# Patient Record
Sex: FEMALE | Race: WHITE | Employment: OTHER | URBAN - METROPOLITAN AREA
[De-identification: names, ages, dates, MRNs, and addresses within clinical notes are randomized per-mention and may not be internally consistent; named-entity substitution may affect disease eponyms.]

---

## 2017-01-17 ENCOUNTER — HOSPITAL ENCOUNTER (OUTPATIENT)
Dept: OTHER | Age: 72
Discharge: HOME | End: 2017-01-17
Attending: NURSE PRACTITIONER | Admitting: NURSE PRACTITIONER

## 2017-01-17 ENCOUNTER — HOSPITAL ENCOUNTER (OUTPATIENT)
Dept: OTHER | Age: 72
Discharge: HOME | End: 2017-01-17
Attending: INTERNAL MEDICINE | Admitting: INTERNAL MEDICINE

## 2017-01-17 ENCOUNTER — OFFICE VISIT (OUTPATIENT)
Dept: CARDIOLOGY CLINIC | Age: 72
End: 2017-01-17

## 2017-01-17 VITALS
HEART RATE: 64 BPM | WEIGHT: 154.5 LBS | HEIGHT: 61 IN | SYSTOLIC BLOOD PRESSURE: 128 MMHG | DIASTOLIC BLOOD PRESSURE: 88 MMHG | BODY MASS INDEX: 29.17 KG/M2

## 2017-01-17 DIAGNOSIS — E78.49 OTHER HYPERLIPIDEMIA: ICD-10-CM

## 2017-01-17 DIAGNOSIS — E61.1 IRON DEFICIENCY: ICD-10-CM

## 2017-01-17 DIAGNOSIS — N17.9 ACUTE RENAL FAILURE, UNSPECIFIED ACUTE RENAL FAILURE TYPE (HCC): ICD-10-CM

## 2017-01-17 DIAGNOSIS — D47.2 MONOCLONAL PARAPROTEINEMIA: ICD-10-CM

## 2017-01-17 DIAGNOSIS — D47.2 MGUS (MONOCLONAL GAMMOPATHY OF UNKNOWN SIGNIFICANCE): Chronic | ICD-10-CM

## 2017-01-17 DIAGNOSIS — E78.2 MIXED HYPERLIPIDEMIA: ICD-10-CM

## 2017-01-17 DIAGNOSIS — I10 ESSENTIAL HYPERTENSION, BENIGN: Primary | ICD-10-CM

## 2017-01-17 LAB
ALT SERPL-CCNC: 20 U/L (ref 10–40)
ANION GAP SERPL CALCULATED.3IONS-SCNC: 14 MMOL/L (ref 3–16)
AST SERPL-CCNC: 23 U/L (ref 15–37)
BUN BLDV-MCNC: 17 MG/DL (ref 7–20)
CALCIUM SERPL-MCNC: 9.1 MG/DL (ref 8.3–10.6)
CHLORIDE BLD-SCNC: 104 MMOL/L (ref 99–110)
CHOLESTEROL, TOTAL: 164 MG/DL (ref 0–199)
CO2: 23 MMOL/L (ref 21–32)
CREAT SERPL-MCNC: 0.9 MG/DL (ref 0.6–1.2)
GFR AFRICAN AMERICAN: >60
GFR NON-AFRICAN AMERICAN: >60
GLUCOSE BLD-MCNC: 98 MG/DL (ref 70–99)
HDLC SERPL-MCNC: 59 MG/DL (ref 40–60)
LDL CHOLESTEROL CALCULATED: 79 MG/DL
POTASSIUM SERPL-SCNC: 4.7 MMOL/L (ref 3.5–5.1)
SODIUM BLD-SCNC: 141 MMOL/L (ref 136–145)
TRIGL SERPL-MCNC: 130 MG/DL (ref 0–150)
VLDLC SERPL CALC-MCNC: 26 MG/DL

## 2017-01-17 PROCEDURE — 99214 OFFICE O/P EST MOD 30 MIN: CPT | Performed by: NURSE PRACTITIONER

## 2017-01-17 RX ORDER — ATORVASTATIN CALCIUM 10 MG/1
10 TABLET, FILM COATED ORAL DAILY
Qty: 90 TABLET | Refills: 5 | Status: SHIPPED | OUTPATIENT
Start: 2017-01-17 | End: 2017-05-04 | Stop reason: SDUPTHER

## 2017-01-19 ENCOUNTER — HOSPITAL ENCOUNTER (OUTPATIENT)
Dept: MAMMOGRAPHY | Age: 72
Discharge: HOME | End: 2017-01-19
Attending: INTERNAL MEDICINE | Admitting: INTERNAL MEDICINE

## 2017-01-19 DIAGNOSIS — Z12.31 VISIT FOR SCREENING MAMMOGRAM: ICD-10-CM

## 2017-05-04 RX ORDER — ATORVASTATIN CALCIUM 10 MG/1
10 TABLET, FILM COATED ORAL DAILY
Qty: 90 TABLET | Refills: 2 | Status: SHIPPED | OUTPATIENT
Start: 2017-05-04 | End: 2018-02-07 | Stop reason: SDUPTHER

## 2017-06-06 PROBLEM — K21.9 GASTROESOPHAGEAL REFLUX DISEASE WITHOUT ESOPHAGITIS: Status: ACTIVE | Noted: 2017-06-06

## 2017-06-06 PROBLEM — E03.9 HYPOTHYROID: Status: ACTIVE | Noted: 2017-06-06

## 2017-06-06 PROBLEM — N39.0 UTI (URINARY TRACT INFECTION): Status: ACTIVE | Noted: 2017-06-06

## 2017-08-04 ENCOUNTER — OFFICE VISIT (OUTPATIENT)
Dept: CARDIOLOGY CLINIC | Age: 72
End: 2017-08-04

## 2017-08-04 VITALS
DIASTOLIC BLOOD PRESSURE: 80 MMHG | BODY MASS INDEX: 28.89 KG/M2 | SYSTOLIC BLOOD PRESSURE: 150 MMHG | WEIGHT: 153 LBS | HEIGHT: 61 IN

## 2017-08-04 DIAGNOSIS — E78.2 MIXED HYPERLIPIDEMIA: ICD-10-CM

## 2017-08-04 DIAGNOSIS — I10 ESSENTIAL HYPERTENSION, BENIGN: Primary | ICD-10-CM

## 2017-08-04 PROCEDURE — 99214 OFFICE O/P EST MOD 30 MIN: CPT | Performed by: NURSE PRACTITIONER

## 2018-02-06 ENCOUNTER — OFFICE VISIT (OUTPATIENT)
Dept: CARDIOLOGY CLINIC | Age: 73
End: 2018-02-06

## 2018-02-06 VITALS
SYSTOLIC BLOOD PRESSURE: 150 MMHG | DIASTOLIC BLOOD PRESSURE: 70 MMHG | BODY MASS INDEX: 28.77 KG/M2 | WEIGHT: 152.4 LBS | HEIGHT: 61 IN

## 2018-02-06 DIAGNOSIS — I10 ESSENTIAL HYPERTENSION, BENIGN: ICD-10-CM

## 2018-02-06 DIAGNOSIS — E78.2 MIXED HYPERLIPIDEMIA: Primary | ICD-10-CM

## 2018-02-06 PROCEDURE — 99214 OFFICE O/P EST MOD 30 MIN: CPT | Performed by: NURSE PRACTITIONER

## 2018-02-06 RX ORDER — GABAPENTIN 300 MG/1
CAPSULE ORAL
Status: ON HOLD | COMMUNITY
Start: 2017-12-14 | End: 2019-07-10 | Stop reason: HOSPADM

## 2018-02-06 NOTE — COMMUNICATION BODY
[address]    Cardiac Follow Up    Cristofer Antunez is 74 y.o. female who presents today for a routine follow up. Teryl Fleischer has a history of hypertension and hyperlipidemia. In May she developed acute renal failure from medications, hyzaar, this was stopped and Evaluated by DR. Doug Duenas. She continues to FU with him. She denies SOB/ROMERO, PND, palpitations, light-headedness, or edema. 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 She is mostly bothered by \"GI issues \" in the AM after she gets up. She did go to ER in June 2017  with chest pain, had GXT which was neg. Believed pain due to GERD, she has not had any recurrence               Past Medical History:    Past Medical History:   Diagnosis Date    Celiac sprue     Hyperlipidemia     Hypertension     Osteoarthritis     Osteoporosis     Vitiligo      Social History:    History   Smoking Status    Never Smoker   Smokeless Tobacco    Never Used     Current Medications:  Current Outpatient Prescriptions on File Prior to Visit   Medication Sig Dispense Refill    famotidine (PEPCID) 20 MG tablet Take 1 tablet by mouth nightly 30 tablet 3    atorvastatin (LIPITOR) 10 MG tablet Take 1 tablet by mouth daily 90 tablet 2    levothyroxine (SYNTHROID) 50 MCG tablet TAKE ONE TABLET BY MOUTH DAILY 90 tablet 2    cloNIDine (CATAPRES) 0.1 MG tablet Take 0.1 mg by mouth 2 times daily       triamcinolone (KENALOG) 0.1 % ointment Apply topically      rOPINIRole (REQUIP) 0.5 MG tablet Take 0.5 mg by mouth nightly       Probiotic Product (PROBIOTIC DAILY PO) Take 1 tablet by mouth daily       Prenatal Vit-Fe Sulfate-FA (PRENATAL VITAMIN PO) Take 1 tablet by mouth daily       Cholecalciferol (VITAMIN D) 2000 UNITS CAPS capsule Take 1 capsule by mouth daily       cetirizine (ZYRTEC) 10 MG tablet Take 10 mg by mouth daily.       Calcium Carbonate-Vit D-Min (CALCIUM 1200 PO) Take 1 tablet by mouth daily        No current

## 2018-02-06 NOTE — LETTER
[facility]  [address]  [address] [address]  Phone: [phone]  Fax: [phone]    Latonya Mosquera NP        February 6, 2018     [facility] [address] [address]    Patient: Melody Myers  MR Number: [id number]  YOB: 1945  Date of Visit: 2/6/2018    Dear Dr. Selma Campos:        Misael Mason is 74 y.o. female who presents today for a routine follow up. Rob Moreno has a history of hypertension and hyperlipidemia. In May she developed acute renal failure from medications, hyzaar, this was stopped and Evaluated by DR. Dior Munoz. She continues to FU with him. She denies SOB/ROMERO, PND, palpitations, light-headedness, or edema. 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 She is mostly bothered by \"GI issues \" in the AM after she gets up. She did go to ER in June 2017  with chest pain, had GXT which was neg.  Believed pain due to GERD, she has not had any recurrence               Past Medical History:    Past Medical History:   Diagnosis Date    Celiac sprue     Hyperlipidemia     Hypertension     Osteoarthritis     Osteoporosis     Vitiligo      Social History:    History   Smoking Status    Never Smoker   Smokeless Tobacco    Never Used     Current Medications:  Current Outpatient Prescriptions on File Prior to Visit   Medication Sig Dispense Refill    famotidine (PEPCID) 20 MG tablet Take 1 tablet by mouth nightly 30 tablet 3    atorvastatin (LIPITOR) 10 MG tablet Take 1 tablet by mouth daily 90 tablet 2    levothyroxine (SYNTHROID) 50 MCG tablet TAKE ONE TABLET BY MOUTH DAILY 90 tablet 2    cloNIDine (CATAPRES) 0.1 MG tablet Take 0.1 mg by mouth 2 times daily       triamcinolone (KENALOG) 0.1 % ointment Apply topically      rOPINIRole (REQUIP) 0.5 MG tablet Take 0.5 mg by mouth nightly  Probiotic Product (PROBIOTIC DAILY PO) Take 1 tablet by mouth daily       Prenatal Vit-Fe Sulfate-FA (PRENATAL VITAMIN PO) Take 1 tablet by mouth daily       Cholecalciferol (VITAMIN D) 2000 UNITS CAPS capsule Take 1 capsule by mouth daily       cetirizine (ZYRTEC) 10 MG tablet Take 10 mg by mouth daily.  Calcium Carbonate-Vit D-Min (CALCIUM 1200 PO) Take 1 tablet by mouth daily        No current facility-administered medications on file prior to visit. REVIEW OF SYSTEMS:    CONSTITUTIONAL: No major weight gain or loss, fatigue, weakness, night sweats or fever. HEENT: No new vision difficulties or ringing in the ears. RESPIRATORY: No new SOB, PND, orthopnea or cough. CARDIOVASCULAR: See HPI  GI: No nausea, vomiting, diarrhea, constipation, abdominal pain or changes in bowel habits. : No urinary frequency, urgency, incontinence hematuria or dysuria. SKIN: No cyanosis or skin lesions. MUSCULOSKELETAL: No new muscle or joint pain. NEUROLOGICAL: No syncope or TIA-like symptoms. PSYCHIATRIC: No anxiety, pain, insomnia or depression    Objective:   PHYSICAL EXAM:        VITALS:    BP (!) 150/70   Ht 5' 1\" (1.549 m)   Wt 152 lb 6.4 oz (69.1 kg)   BMI 28.80 kg/m²    CONSTITUTIONAL: Cooperative, no apparent distress, and appears well nourished / developed  NEUROLOGIC:  Awake and orientated to person, place and time. PSYCH: Calm affect. SKIN: Warm and dry. HEENT: Sclera non-icteric, normocephalic, neck supple, no elevation of JVP, normal carotid pulses with no bruits and thyroid normal size. LUNGS:  No increased work of breathing and clear to auscultation, no crackles or wheezing  CARDIOVASCULAR:  Regular rate and rhythm with no murmurs, gallops, rubs, or abnormal heart sounds, normal PMI. The apical impulses not displaced  JVP less than 8 cm H2O  Heart tones are crisp and normal  Cervical veins are not engorged  The carotid upstroke is normal in amplitude and contour without delay or bruit JVP is not elevated  ABDOMEN:  Normal bowel sounds, non-distended and non-tender to palpation  EXT: No edema, no calf tenderness. Pulses are present bilaterally. DATA:    Lab Results   Component Value Date    ALT 15 06/05/2017    AST 21 06/05/2017    ALKPHOS 67 06/05/2017    BILITOT <0.2 06/05/2017     Lab Results   Component Value Date    CREATININE 0.8 06/05/2017    BUN 26 (H) 06/05/2017     (L) 06/05/2017    K 4.2 06/05/2017    CL 99 06/05/2017    CO2 19 (L) 06/05/2017     Lab Results   Component Value Date    TSH 2.09 12/08/2015     Lab Results   Component Value Date    WBC 5.5 03/09/2017    HGB 12.7 03/09/2017    HCT 40.3 03/09/2017    MCV 87.8 03/09/2017     03/09/2017     No components found for: CHLPL  Lab Results   Component Value Date    TRIG 130 01/17/2017    TRIG 242 (H) 12/08/2015    TRIG 179 (H) 06/23/2015     Lab Results   Component Value Date    HDL 59 01/17/2017    HDL 52 12/08/2015    HDL 50 06/23/2015     Lab Results   Component Value Date    LDLCALC 79 01/17/2017    LDLCALC 129 12/08/2015    LDLCALC 93 06/23/2015     Lab Results   Component Value Date    LABVLDL 26 01/17/2017    LABVLDL 36 06/23/2015    LABVLDL 36 04/19/2014     Radiology Review:  Pertinent images / reports were reviewed as a part of this visit and reveals the following:    Last Echo: 2/2012 stress echo which was totally normal    Assessment:     1. Essential hypertension: had ARF with Cr up 2.3, hyzaar stopped evaluated by Dr. Blanca Clark  BP retaken by me sitting 138/80, standing 148/88     2.  Other and unspecified hyperlipidemia 11/15,  Component Value Ref Range & Units Status     Cholesterol, Total 229 (H) <200 mg/dL Final    Triglycerides 242 (H) <150 mg/dL Final    Optimal testing for triglycerides should be performed following a 12 to 14 hour fast.    HDL 52 >50 mg/dL Final    LDL Calculated 129     This was off lipitor due to leg aches,   now on 10 mg of lipitor 1/17

## 2018-02-06 NOTE — PROGRESS NOTES
[address]    Cardiac Follow Up    Janice Thomas is 74 y.o. female who presents today for a routine follow up. Maty Rebolledo has a history of hypertension and hyperlipidemia. In May she developed acute renal failure from medications, hyzaar, this was stopped and Evaluated by DR. Pato Leonard. She continues to FU with him. She denies SOB/ROMERO, PND, palpitations, light-headedness, or edema. 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 She is mostly bothered by \"GI issues \" in the AM after she gets up. She did go to ER in June 2017  with chest pain, had GXT which was neg. Believed pain due to GERD, she has not had any recurrence               Past Medical History:    Past Medical History:   Diagnosis Date    Celiac sprue     Hyperlipidemia     Hypertension     Osteoarthritis     Osteoporosis     Vitiligo      Social History:    History   Smoking Status    Never Smoker   Smokeless Tobacco    Never Used     Current Medications:  Current Outpatient Prescriptions on File Prior to Visit   Medication Sig Dispense Refill    famotidine (PEPCID) 20 MG tablet Take 1 tablet by mouth nightly 30 tablet 3    atorvastatin (LIPITOR) 10 MG tablet Take 1 tablet by mouth daily 90 tablet 2    levothyroxine (SYNTHROID) 50 MCG tablet TAKE ONE TABLET BY MOUTH DAILY 90 tablet 2    cloNIDine (CATAPRES) 0.1 MG tablet Take 0.1 mg by mouth 2 times daily       triamcinolone (KENALOG) 0.1 % ointment Apply topically      rOPINIRole (REQUIP) 0.5 MG tablet Take 0.5 mg by mouth nightly       Probiotic Product (PROBIOTIC DAILY PO) Take 1 tablet by mouth daily       Prenatal Vit-Fe Sulfate-FA (PRENATAL VITAMIN PO) Take 1 tablet by mouth daily       Cholecalciferol (VITAMIN D) 2000 UNITS CAPS capsule Take 1 capsule by mouth daily       cetirizine (ZYRTEC) 10 MG tablet Take 10 mg by mouth daily.       Calcium Carbonate-Vit D-Min (CALCIUM 1200 PO) Take 1 tablet by mouth daily        No current 06/05/2017    K 4.2 06/05/2017    CL 99 06/05/2017    CO2 19 (L) 06/05/2017     Lab Results   Component Value Date    TSH 2.09 12/08/2015     Lab Results   Component Value Date    WBC 5.5 03/09/2017    HGB 12.7 03/09/2017    HCT 40.3 03/09/2017    MCV 87.8 03/09/2017     03/09/2017     No components found for: CHLPL  Lab Results   Component Value Date    TRIG 130 01/17/2017    TRIG 242 (H) 12/08/2015    TRIG 179 (H) 06/23/2015     Lab Results   Component Value Date    HDL 59 01/17/2017    HDL 52 12/08/2015    HDL 50 06/23/2015     Lab Results   Component Value Date    LDLCALC 79 01/17/2017    LDLCALC 129 12/08/2015    LDLCALC 93 06/23/2015     Lab Results   Component Value Date    LABVLDL 26 01/17/2017    LABVLDL 36 06/23/2015    LABVLDL 36 04/19/2014     Radiology Review:  Pertinent images / reports were reviewed as a part of this visit and reveals the following:    Last Echo: 2/2012 stress echo which was totally normal    Assessment:     1. Essential hypertension: had ARF with Cr up 2.3, hyzaar stopped evaluated by Dr. Karan Reich  BP retaken by me sitting 138/80, standing 148/88     2. Other and unspecified hyperlipidemia 11/15,  Component Value Ref Range & Units Status     Cholesterol, Total 229 (H) <200 mg/dL Final    Triglycerides 242 (H) <150 mg/dL Final    Optimal testing for triglycerides should be performed following a 12 to 14 hour fast.    HDL 52 >50 mg/dL Final    LDL Calculated 129     This was off lipitor due to leg aches,   now on 10 mg of lipitor 1/17  Component Value Ref Range & Units Status   Cholesterol, Total 164 0 - 199 mg/dL Final   Triglycerides 130 0 - 150 mg/dL Final   HDL 59 40 - 60 mg/dL Final   LDL Calculated 79 <100 mg/dL Final   VLDL CHOLESTEROL                Plan:   No change in medications    OV 6 months  JESSE Finley CNP  [address]      Thank you for allowing to us to participate in the care of Jaylen Cordero.

## 2018-02-07 RX ORDER — ATORVASTATIN CALCIUM 10 MG/1
TABLET, FILM COATED ORAL
Qty: 90 TABLET | Refills: 2 | Status: SHIPPED | OUTPATIENT
Start: 2018-02-07 | End: 2018-11-06 | Stop reason: SDUPTHER

## 2018-04-20 ENCOUNTER — TELEPHONE (OUTPATIENT)
Dept: NEUROLOGY | Age: 73
End: 2018-04-20

## 2018-06-14 ENCOUNTER — OFFICE VISIT (OUTPATIENT)
Dept: NEUROLOGY | Age: 73
End: 2018-06-14

## 2018-06-14 VITALS
SYSTOLIC BLOOD PRESSURE: 139 MMHG | DIASTOLIC BLOOD PRESSURE: 78 MMHG | WEIGHT: 147 LBS | HEIGHT: 61 IN | BODY MASS INDEX: 27.75 KG/M2 | HEART RATE: 72 BPM

## 2018-06-14 DIAGNOSIS — M79.604 RIGHT LEG PAIN: Primary | ICD-10-CM

## 2018-06-14 DIAGNOSIS — R20.2 NUMBNESS AND TINGLING: ICD-10-CM

## 2018-06-14 DIAGNOSIS — M54.16 LUMBAR RADICULOPATHY: ICD-10-CM

## 2018-06-14 DIAGNOSIS — R20.0 NUMBNESS AND TINGLING: ICD-10-CM

## 2018-06-14 PROCEDURE — 99204 OFFICE O/P NEW MOD 45 MIN: CPT | Performed by: PSYCHIATRY & NEUROLOGY

## 2018-06-14 RX ORDER — FOLIC ACID 0.8 MG
TABLET ORAL
COMMUNITY

## 2018-09-26 PROBLEM — N39.0 UTI (URINARY TRACT INFECTION): Status: RESOLVED | Noted: 2017-06-06 | Resolved: 2018-09-26

## 2018-10-30 ENCOUNTER — HOSPITAL ENCOUNTER (OUTPATIENT)
Dept: CT IMAGING | Age: 73
Discharge: HOME | End: 2018-10-30
Payer: MEDICARE

## 2018-10-30 DIAGNOSIS — D47.2 MGUS (MONOCLONAL GAMMOPATHY OF UNKNOWN SIGNIFICANCE): ICD-10-CM

## 2018-10-30 PROCEDURE — 71250 CT THORAX DX C-: CPT

## 2018-11-06 RX ORDER — ATORVASTATIN CALCIUM 10 MG/1
TABLET, FILM COATED ORAL
Qty: 90 TABLET | Refills: 0 | Status: SHIPPED | OUTPATIENT
Start: 2018-11-06 | End: 2019-02-05

## 2018-11-08 RX ORDER — ATORVASTATIN CALCIUM 10 MG/1
TABLET, FILM COATED ORAL
Qty: 90 TABLET | Refills: 1 | Status: SHIPPED | OUTPATIENT
Start: 2018-11-08 | End: 2019-02-05 | Stop reason: SDUPTHER

## 2018-11-29 ENCOUNTER — OFFICE VISIT (OUTPATIENT)
Dept: PULMONOLOGY | Age: 73
End: 2018-11-29
Payer: MEDICARE

## 2018-11-29 VITALS
BODY MASS INDEX: 29.27 KG/M2 | HEIGHT: 61 IN | HEART RATE: 68 BPM | SYSTOLIC BLOOD PRESSURE: 171 MMHG | WEIGHT: 155 LBS | RESPIRATION RATE: 18 BRPM | DIASTOLIC BLOOD PRESSURE: 82 MMHG | OXYGEN SATURATION: 92 %

## 2018-11-29 DIAGNOSIS — K21.9 GASTROESOPHAGEAL REFLUX DISEASE, ESOPHAGITIS PRESENCE NOT SPECIFIED: ICD-10-CM

## 2018-11-29 DIAGNOSIS — R06.02 SHORTNESS OF BREATH: ICD-10-CM

## 2018-11-29 DIAGNOSIS — R91.8 PULMONARY NODULES: ICD-10-CM

## 2018-11-29 DIAGNOSIS — J47.9 BRONCHIECTASIS WITHOUT COMPLICATION (HCC): ICD-10-CM

## 2018-11-29 DIAGNOSIS — R93.89 ABNORMAL CT OF THE CHEST: Primary | ICD-10-CM

## 2018-11-29 PROCEDURE — 99204 OFFICE O/P NEW MOD 45 MIN: CPT | Performed by: INTERNAL MEDICINE

## 2018-12-03 ENCOUNTER — ANESTHESIA EVENT (OUTPATIENT)
Dept: ENDOSCOPY | Age: 73
End: 2018-12-03
Payer: MEDICARE

## 2018-12-06 ENCOUNTER — HOSPITAL ENCOUNTER (OUTPATIENT)
Dept: PULMONOLOGY | Age: 73
Discharge: HOME | End: 2018-12-06
Payer: MEDICARE

## 2018-12-06 ENCOUNTER — HOSPITAL ENCOUNTER (OUTPATIENT)
Dept: NON INVASIVE DIAGNOSTICS | Age: 73
Discharge: HOME | End: 2018-12-06
Payer: MEDICARE

## 2018-12-06 VITALS — OXYGEN SATURATION: 98 % | RESPIRATION RATE: 18 BRPM | HEART RATE: 66 BPM

## 2018-12-06 LAB
LV EF: 55 %
LVEF MODALITY: NORMAL

## 2018-12-06 PROCEDURE — 94729 DIFFUSING CAPACITY: CPT

## 2018-12-06 PROCEDURE — 93306 TTE W/DOPPLER COMPLETE: CPT

## 2018-12-06 PROCEDURE — 94760 N-INVAS EAR/PLS OXIMETRY 1: CPT

## 2018-12-06 PROCEDURE — 94726 PLETHYSMOGRAPHY LUNG VOLUMES: CPT

## 2018-12-06 PROCEDURE — 94664 DEMO&/EVAL PT USE INHALER: CPT

## 2018-12-06 PROCEDURE — 94010 BREATHING CAPACITY TEST: CPT

## 2018-12-07 ENCOUNTER — ANESTHESIA (OUTPATIENT)
Dept: ENDOSCOPY | Age: 73
End: 2018-12-07
Payer: MEDICARE

## 2018-12-07 ENCOUNTER — HOSPITAL ENCOUNTER (OUTPATIENT)
Age: 73
Setting detail: OUTPATIENT SURGERY
Discharge: HOME | End: 2018-12-07
Attending: INTERNAL MEDICINE | Admitting: INTERNAL MEDICINE
Payer: MEDICARE

## 2018-12-07 VITALS
TEMPERATURE: 97.1 F | WEIGHT: 152 LBS | SYSTOLIC BLOOD PRESSURE: 147 MMHG | DIASTOLIC BLOOD PRESSURE: 69 MMHG | BODY MASS INDEX: 28.7 KG/M2 | OXYGEN SATURATION: 99 % | RESPIRATION RATE: 15 BRPM | HEART RATE: 59 BPM | HEIGHT: 61 IN

## 2018-12-07 VITALS — SYSTOLIC BLOOD PRESSURE: 121 MMHG | DIASTOLIC BLOOD PRESSURE: 62 MMHG | OXYGEN SATURATION: 97 %

## 2018-12-07 LAB
ANION GAP SERPL CALCULATED.3IONS-SCNC: 15 MMOL/L (ref 3–16)
APPEARANCE BAL (LAVAGE): ABNORMAL
APTT: 29.1 SEC (ref 26–36)
BUN BLDV-MCNC: 12 MG/DL (ref 7–20)
CALCIUM SERPL-MCNC: 9.9 MG/DL (ref 8.3–10.6)
CHLORIDE BLD-SCNC: 97 MMOL/L (ref 99–110)
CLOT EVALUATION BAL: ABNORMAL
CO2: 23 MMOL/L (ref 21–32)
COLOR LAVAGE: COLORLESS
CREAT SERPL-MCNC: 0.7 MG/DL (ref 0.6–1.2)
EOSIN: 10 %
EPITHELIAL CELLS FLUID: 6 %
GFR AFRICAN AMERICAN: >60
GFR NON-AFRICAN AMERICAN: >60
GLUCOSE BLD-MCNC: 116 MG/DL (ref 70–99)
HCT VFR BLD CALC: 37.7 % (ref 36–48)
HEMOGLOBIN: 12.6 G/DL (ref 12–16)
INR BLD: 1.04 (ref 0.86–1.14)
LYMPHOCYTES, BAL: 72 % (ref 5–10)
MACROPHAGES, BAL: 8 % (ref 90–95)
MCH RBC QN AUTO: 27.1 PG (ref 26–34)
MCHC RBC AUTO-ENTMCNC: 33.4 G/DL (ref 31–36)
MCV RBC AUTO: 81 FL (ref 80–100)
NUMBER OF CELLS COUNTED BAL (LAVAGE): 200
PDW BLD-RTO: 14.5 % (ref 12.4–15.4)
PLATELET # BLD: 240 K/UL (ref 135–450)
PMV BLD AUTO: 8.2 FL (ref 5–10.5)
POTASSIUM SERPL-SCNC: 3.9 MMOL/L (ref 3.5–5.1)
PROTHROMBIN TIME: 11.9 SEC (ref 9.8–13)
RBC # BLD: 4.65 M/UL (ref 4–5.2)
RBC, BAL: 2500 /CUMM
SEGMENTED NEUTROPHILS, BAL: 4 % (ref 5–10)
SODIUM BLD-SCNC: 135 MMOL/L (ref 136–145)
WBC # BLD: 5.9 K/UL (ref 4–11)
WBC/EPI CELLS BAL: 326 /CUMM

## 2018-12-07 PROCEDURE — 6360000002 HC RX W HCPCS: Performed by: NURSE ANESTHETIST, CERTIFIED REGISTERED

## 2018-12-07 PROCEDURE — 7100000010 HC PHASE II RECOVERY - FIRST 15 MIN: Performed by: INTERNAL MEDICINE

## 2018-12-07 PROCEDURE — 87116 MYCOBACTERIA CULTURE: CPT

## 2018-12-07 PROCEDURE — 80048 BASIC METABOLIC PNL TOTAL CA: CPT

## 2018-12-07 PROCEDURE — 88305 TISSUE EXAM BY PATHOLOGIST: CPT

## 2018-12-07 PROCEDURE — 87102 FUNGUS ISOLATION CULTURE: CPT

## 2018-12-07 PROCEDURE — 88112 CYTOPATH CELL ENHANCE TECH: CPT

## 2018-12-07 PROCEDURE — 87070 CULTURE OTHR SPECIMN AEROBIC: CPT

## 2018-12-07 PROCEDURE — 87206 SMEAR FLUORESCENT/ACID STAI: CPT

## 2018-12-07 PROCEDURE — 2500000003 HC RX 250 WO HCPCS: Performed by: NURSE ANESTHETIST, CERTIFIED REGISTERED

## 2018-12-07 PROCEDURE — 3609010800 HC BRONCHOSCOPY ALVEOLAR LAVAGE: Performed by: INTERNAL MEDICINE

## 2018-12-07 PROCEDURE — 2500000003 HC RX 250 WO HCPCS: Performed by: INTERNAL MEDICINE

## 2018-12-07 PROCEDURE — 7100000011 HC PHASE II RECOVERY - ADDTL 15 MIN: Performed by: INTERNAL MEDICINE

## 2018-12-07 PROCEDURE — 2580000003 HC RX 258: Performed by: NURSE ANESTHETIST, CERTIFIED REGISTERED

## 2018-12-07 PROCEDURE — 85027 COMPLETE CBC AUTOMATED: CPT

## 2018-12-07 PROCEDURE — 3700000000 HC ANESTHESIA ATTENDED CARE: Performed by: INTERNAL MEDICINE

## 2018-12-07 PROCEDURE — 87015 SPECIMEN INFECT AGNT CONCNTJ: CPT

## 2018-12-07 PROCEDURE — 89051 BODY FLUID CELL COUNT: CPT

## 2018-12-07 PROCEDURE — 31624 DX BRONCHOSCOPE/LAVAGE: CPT | Performed by: INTERNAL MEDICINE

## 2018-12-07 PROCEDURE — 2709999900 HC NON-CHARGEABLE SUPPLY: Performed by: INTERNAL MEDICINE

## 2018-12-07 PROCEDURE — 7100000001 HC PACU RECOVERY - ADDTL 15 MIN: Performed by: INTERNAL MEDICINE

## 2018-12-07 PROCEDURE — 3700000001 HC ADD 15 MINUTES (ANESTHESIA): Performed by: INTERNAL MEDICINE

## 2018-12-07 PROCEDURE — 7100000000 HC PACU RECOVERY - FIRST 15 MIN: Performed by: INTERNAL MEDICINE

## 2018-12-07 PROCEDURE — 87205 SMEAR GRAM STAIN: CPT

## 2018-12-07 PROCEDURE — 2580000003 HC RX 258: Performed by: ANESTHESIOLOGY

## 2018-12-07 PROCEDURE — 85730 THROMBOPLASTIN TIME PARTIAL: CPT

## 2018-12-07 PROCEDURE — 85610 PROTHROMBIN TIME: CPT

## 2018-12-07 RX ORDER — SODIUM CHLORIDE 9 MG/ML
INJECTION, SOLUTION INTRAVENOUS CONTINUOUS PRN
Status: DISCONTINUED | OUTPATIENT
Start: 2018-12-07 | End: 2018-12-07 | Stop reason: SDUPTHER

## 2018-12-07 RX ORDER — KETAMINE HYDROCHLORIDE 10 MG/ML
INJECTION, SOLUTION INTRAMUSCULAR; INTRAVENOUS PRN
Status: DISCONTINUED | OUTPATIENT
Start: 2018-12-07 | End: 2018-12-07

## 2018-12-07 RX ORDER — KETAMINE HYDROCHLORIDE 10 MG/ML
INJECTION, SOLUTION INTRAMUSCULAR; INTRAVENOUS PRN
Status: DISCONTINUED | OUTPATIENT
Start: 2018-12-07 | End: 2018-12-07 | Stop reason: SDUPTHER

## 2018-12-07 RX ORDER — LIDOCAINE HYDROCHLORIDE 10 MG/ML
1 INJECTION, SOLUTION EPIDURAL; INFILTRATION; INTRACAUDAL; PERINEURAL
Status: DISCONTINUED | OUTPATIENT
Start: 2018-12-07 | End: 2018-12-07 | Stop reason: HOSPADM

## 2018-12-07 RX ORDER — LIDOCAINE HYDROCHLORIDE 20 MG/ML
INJECTION, SOLUTION INFILTRATION; PERINEURAL PRN
Status: DISCONTINUED | OUTPATIENT
Start: 2018-12-07 | End: 2018-12-07 | Stop reason: SDUPTHER

## 2018-12-07 RX ORDER — SODIUM CHLORIDE 0.9 % (FLUSH) 0.9 %
10 SYRINGE (ML) INJECTION EVERY 12 HOURS SCHEDULED
Status: DISCONTINUED | OUTPATIENT
Start: 2018-12-07 | End: 2018-12-07 | Stop reason: HOSPADM

## 2018-12-07 RX ORDER — SODIUM CHLORIDE 0.9 % (FLUSH) 0.9 %
10 SYRINGE (ML) INJECTION PRN
Status: DISCONTINUED | OUTPATIENT
Start: 2018-12-07 | End: 2018-12-07 | Stop reason: HOSPADM

## 2018-12-07 RX ORDER — LIDOCAINE HYDROCHLORIDE 40 MG/ML
INJECTION, SOLUTION RETROBULBAR; TOPICAL PRN
Status: DISCONTINUED | OUTPATIENT
Start: 2018-12-07 | End: 2018-12-07 | Stop reason: HOSPADM

## 2018-12-07 RX ORDER — PROPOFOL 10 MG/ML
INJECTION, EMULSION INTRAVENOUS PRN
Status: DISCONTINUED | OUTPATIENT
Start: 2018-12-07 | End: 2018-12-07 | Stop reason: SDUPTHER

## 2018-12-07 RX ORDER — SODIUM CHLORIDE 9 MG/ML
INJECTION, SOLUTION INTRAVENOUS CONTINUOUS
Status: DISCONTINUED | OUTPATIENT
Start: 2018-12-07 | End: 2018-12-07 | Stop reason: HOSPADM

## 2018-12-07 RX ADMIN — PROPOFOL 50 MG: 10 INJECTION, EMULSION INTRAVENOUS at 08:10

## 2018-12-07 RX ADMIN — KETAMINE HYDROCHLORIDE 5 MG: 10 INJECTION, SOLUTION INTRAMUSCULAR; INTRAVENOUS at 07:55

## 2018-12-07 RX ADMIN — PROPOFOL 50 MG: 10 INJECTION, EMULSION INTRAVENOUS at 07:55

## 2018-12-07 RX ADMIN — SODIUM CHLORIDE: 9 INJECTION, SOLUTION INTRAVENOUS at 07:39

## 2018-12-07 RX ADMIN — LIDOCAINE HYDROCHLORIDE 60 MG: 20 INJECTION, SOLUTION INFILTRATION; PERINEURAL at 07:55

## 2018-12-07 RX ADMIN — SODIUM CHLORIDE: 9 INJECTION, SOLUTION INTRAVENOUS at 07:30

## 2018-12-07 RX ADMIN — PROPOFOL 50 MG: 10 INJECTION, EMULSION INTRAVENOUS at 08:20

## 2018-12-07 ASSESSMENT — PULMONARY FUNCTION TESTS
PIF_VALUE: 0

## 2018-12-07 ASSESSMENT — ENCOUNTER SYMPTOMS: SHORTNESS OF BREATH: 0

## 2018-12-07 ASSESSMENT — PAIN - FUNCTIONAL ASSESSMENT: PAIN_FUNCTIONAL_ASSESSMENT: 0-10

## 2018-12-09 LAB
CULTURE, RESPIRATORY: NORMAL
CULTURE, RESPIRATORY: NORMAL
GRAM STAIN RESULT: NORMAL
GRAM STAIN RESULT: NORMAL

## 2019-01-03 ENCOUNTER — OFFICE VISIT (OUTPATIENT)
Dept: PULMONOLOGY | Age: 74
End: 2019-01-03
Payer: MEDICARE

## 2019-01-03 VITALS
DIASTOLIC BLOOD PRESSURE: 82 MMHG | RESPIRATION RATE: 18 BRPM | HEART RATE: 62 BPM | HEIGHT: 61 IN | OXYGEN SATURATION: 94 % | SYSTOLIC BLOOD PRESSURE: 172 MMHG | BODY MASS INDEX: 29.45 KG/M2 | WEIGHT: 156 LBS

## 2019-01-03 DIAGNOSIS — R05.3 CHRONIC COUGH: ICD-10-CM

## 2019-01-03 DIAGNOSIS — J47.9 BRONCHIECTASIS WITHOUT COMPLICATION (HCC): Primary | ICD-10-CM

## 2019-01-03 DIAGNOSIS — K21.9 GASTROESOPHAGEAL REFLUX DISEASE, ESOPHAGITIS PRESENCE NOT SPECIFIED: ICD-10-CM

## 2019-01-03 DIAGNOSIS — R91.8 PULMONARY NODULES: ICD-10-CM

## 2019-01-03 DIAGNOSIS — J45.30 MILD PERSISTENT ASTHMA WITHOUT COMPLICATION: ICD-10-CM

## 2019-01-03 PROCEDURE — 99214 OFFICE O/P EST MOD 30 MIN: CPT | Performed by: INTERNAL MEDICINE

## 2019-01-03 RX ORDER — LANSOPRAZOLE 30 MG/1
30 CAPSULE, DELAYED RELEASE ORAL DAILY
Qty: 30 CAPSULE | Refills: 4 | Status: ON HOLD | OUTPATIENT
Start: 2019-01-03 | End: 2019-07-09 | Stop reason: HOSPADM

## 2019-01-03 RX ORDER — BUDESONIDE AND FORMOTEROL FUMARATE DIHYDRATE 160; 4.5 UG/1; UG/1
2 AEROSOL RESPIRATORY (INHALATION) 2 TIMES DAILY
Qty: 1 INHALER | Refills: 4 | Status: ON HOLD | OUTPATIENT
Start: 2019-01-03 | End: 2019-07-10 | Stop reason: HOSPADM

## 2019-01-07 LAB
FUNGUS (MYCOLOGY) CULTURE: ABNORMAL
FUNGUS (MYCOLOGY) CULTURE: ABNORMAL
FUNGUS (MYCOLOGY) CULTURE: NORMAL
FUNGUS STAIN: ABNORMAL
FUNGUS STAIN: NORMAL
ORGANISM: ABNORMAL

## 2019-01-15 ENCOUNTER — OFFICE VISIT (OUTPATIENT)
Dept: ENT CLINIC | Age: 74
End: 2019-01-15
Payer: MEDICARE

## 2019-01-15 VITALS — SYSTOLIC BLOOD PRESSURE: 154 MMHG | HEART RATE: 64 BPM | DIASTOLIC BLOOD PRESSURE: 80 MMHG | OXYGEN SATURATION: 97 %

## 2019-01-15 DIAGNOSIS — R05.3 PERSISTENT COUGH: Primary | ICD-10-CM

## 2019-01-15 PROCEDURE — 96372 THER/PROPH/DIAG INJ SC/IM: CPT | Performed by: OTOLARYNGOLOGY

## 2019-01-15 PROCEDURE — 31575 DIAGNOSTIC LARYNGOSCOPY: CPT | Performed by: OTOLARYNGOLOGY

## 2019-01-15 RX ORDER — BENZONATATE 100 MG/1
100 CAPSULE ORAL 3 TIMES DAILY PRN
Qty: 30 CAPSULE | Refills: 0 | Status: ON HOLD | OUTPATIENT
Start: 2019-01-15 | End: 2019-07-10 | Stop reason: HOSPADM

## 2019-01-15 RX ORDER — METHYLPREDNISOLONE ACETATE 40 MG/ML
40 INJECTION, SUSPENSION INTRA-ARTICULAR; INTRALESIONAL; INTRAMUSCULAR; SOFT TISSUE ONCE
Status: COMPLETED | OUTPATIENT
Start: 2019-01-15 | End: 2019-01-15

## 2019-01-15 RX ADMIN — METHYLPREDNISOLONE ACETATE 40 MG: 40 INJECTION, SUSPENSION INTRA-ARTICULAR; INTRALESIONAL; INTRAMUSCULAR; SOFT TISSUE at 12:40

## 2019-01-22 LAB
AFB CULTURE (MYCOBACTERIA): NORMAL
AFB CULTURE (MYCOBACTERIA): NORMAL
AFB SMEAR: NORMAL
AFB SMEAR: NORMAL

## 2019-02-02 ENCOUNTER — HOSPITAL ENCOUNTER (OUTPATIENT)
Dept: CT IMAGING | Age: 74
Discharge: HOME | End: 2019-02-02
Payer: MEDICARE

## 2019-02-02 DIAGNOSIS — R05.3 PERSISTENT COUGH: ICD-10-CM

## 2019-02-02 PROCEDURE — 70486 CT MAXILLOFACIAL W/O DYE: CPT

## 2019-02-05 ENCOUNTER — TELEPHONE (OUTPATIENT)
Dept: ENT CLINIC | Age: 74
End: 2019-02-05

## 2019-02-05 ENCOUNTER — OFFICE VISIT (OUTPATIENT)
Dept: CARDIOLOGY CLINIC | Age: 74
End: 2019-02-05
Payer: MEDICARE

## 2019-02-05 VITALS
SYSTOLIC BLOOD PRESSURE: 110 MMHG | WEIGHT: 155.5 LBS | HEIGHT: 61 IN | HEART RATE: 62 BPM | DIASTOLIC BLOOD PRESSURE: 60 MMHG | BODY MASS INDEX: 29.36 KG/M2

## 2019-02-05 DIAGNOSIS — I10 ESSENTIAL HYPERTENSION, BENIGN: ICD-10-CM

## 2019-02-05 DIAGNOSIS — E78.2 MIXED HYPERLIPIDEMIA: Primary | ICD-10-CM

## 2019-02-05 PROCEDURE — 99214 OFFICE O/P EST MOD 30 MIN: CPT | Performed by: NURSE PRACTITIONER

## 2019-02-05 RX ORDER — ATORVASTATIN CALCIUM 10 MG/1
TABLET, FILM COATED ORAL
Qty: 90 TABLET | Refills: 3 | Status: SHIPPED | OUTPATIENT
Start: 2019-02-05 | End: 2020-05-04 | Stop reason: SDUPTHER

## 2019-02-05 RX ORDER — VITAMIN B COMPLEX
1 CAPSULE ORAL DAILY
COMMUNITY

## 2019-02-06 ENCOUNTER — TELEPHONE (OUTPATIENT)
Dept: PULMONOLOGY | Age: 74
End: 2019-02-06

## 2019-02-19 ENCOUNTER — OFFICE VISIT (OUTPATIENT)
Dept: ENT CLINIC | Age: 74
End: 2019-02-19
Payer: MEDICARE

## 2019-02-19 VITALS
SYSTOLIC BLOOD PRESSURE: 144 MMHG | BODY MASS INDEX: 28.7 KG/M2 | HEIGHT: 61 IN | DIASTOLIC BLOOD PRESSURE: 80 MMHG | WEIGHT: 152 LBS

## 2019-02-19 DIAGNOSIS — K21.9 GASTROESOPHAGEAL REFLUX DISEASE, ESOPHAGITIS PRESENCE NOT SPECIFIED: Primary | ICD-10-CM

## 2019-02-19 PROCEDURE — 99212 OFFICE O/P EST SF 10 MIN: CPT | Performed by: OTOLARYNGOLOGY

## 2019-02-19 RX ORDER — RANITIDINE HCL 75 MG
75 TABLET ORAL 2 TIMES DAILY
Qty: 30 TABLET | Refills: 1 | Status: ON HOLD | OUTPATIENT
Start: 2019-02-19 | End: 2019-07-09 | Stop reason: HOSPADM

## 2019-03-14 ENCOUNTER — OFFICE VISIT (OUTPATIENT)
Dept: ENT CLINIC | Age: 74
End: 2019-03-14
Payer: MEDICARE

## 2019-03-14 VITALS — DIASTOLIC BLOOD PRESSURE: 70 MMHG | HEART RATE: 64 BPM | OXYGEN SATURATION: 96 % | SYSTOLIC BLOOD PRESSURE: 136 MMHG

## 2019-03-14 DIAGNOSIS — K21.9 GASTROESOPHAGEAL REFLUX DISEASE, ESOPHAGITIS PRESENCE NOT SPECIFIED: Primary | ICD-10-CM

## 2019-03-14 DIAGNOSIS — R05.3 PERSISTENT COUGH: ICD-10-CM

## 2019-03-14 PROCEDURE — 99212 OFFICE O/P EST SF 10 MIN: CPT | Performed by: OTOLARYNGOLOGY

## 2019-04-18 ENCOUNTER — OFFICE VISIT (OUTPATIENT)
Dept: ENT CLINIC | Age: 74
End: 2019-04-18
Payer: MEDICARE

## 2019-04-18 VITALS — SYSTOLIC BLOOD PRESSURE: 120 MMHG | HEART RATE: 69 BPM | DIASTOLIC BLOOD PRESSURE: 78 MMHG | OXYGEN SATURATION: 97 %

## 2019-04-18 DIAGNOSIS — R05.3 PERSISTENT COUGH: Primary | ICD-10-CM

## 2019-04-18 PROCEDURE — 99212 OFFICE O/P EST SF 10 MIN: CPT | Performed by: OTOLARYNGOLOGY

## 2019-06-12 ENCOUNTER — TELEPHONE (OUTPATIENT)
Dept: PULMONOLOGY | Age: 74
End: 2019-06-12

## 2019-06-12 NOTE — TELEPHONE ENCOUNTER
Pt is scheduled to have her CT Chest / Abdomen / Pelvis on 6/20/19 ordered by Dr Clinton Printers on vm to have pt call to schedule a follow up appt in July

## 2019-06-20 ENCOUNTER — HOSPITAL ENCOUNTER (OUTPATIENT)
Dept: CT IMAGING | Age: 74
Discharge: HOME | End: 2019-06-20
Payer: MEDICARE

## 2019-06-20 DIAGNOSIS — D47.2 MGUS (MONOCLONAL GAMMOPATHY OF UNKNOWN SIGNIFICANCE): ICD-10-CM

## 2019-06-20 LAB
A/G RATIO: 1.2 (ref 1.1–2.2)
ALBUMIN SERPL-MCNC: 4.7 G/DL (ref 3.4–5)
ALP BLD-CCNC: 111 U/L (ref 40–129)
ALT SERPL-CCNC: 9 U/L (ref 10–40)
ANION GAP SERPL CALCULATED.3IONS-SCNC: 13 MMOL/L (ref 3–16)
AST SERPL-CCNC: 25 U/L (ref 15–37)
BILIRUB SERPL-MCNC: 0.5 MG/DL (ref 0–1)
BUN BLDV-MCNC: 12 MG/DL (ref 7–20)
CALCIUM SERPL-MCNC: 9.7 MG/DL (ref 8.3–10.6)
CHLORIDE BLD-SCNC: 99 MMOL/L (ref 99–110)
CO2: 25 MMOL/L (ref 21–32)
CREAT SERPL-MCNC: 0.7 MG/DL (ref 0.6–1.2)
GFR AFRICAN AMERICAN: >60
GFR NON-AFRICAN AMERICAN: >60
GLOBULIN: 3.9 G/DL
GLUCOSE BLD-MCNC: 111 MG/DL (ref 70–99)
POTASSIUM SERPL-SCNC: 4.3 MMOL/L (ref 3.5–5.1)
SODIUM BLD-SCNC: 137 MMOL/L (ref 136–145)
TOTAL PROTEIN: 8.6 G/DL (ref 6.4–8.2)

## 2019-06-20 PROCEDURE — 6360000004 HC RX CONTRAST MEDICATION: Performed by: INTERNAL MEDICINE

## 2019-06-20 PROCEDURE — 80053 COMPREHEN METABOLIC PANEL: CPT

## 2019-06-20 PROCEDURE — 36415 COLL VENOUS BLD VENIPUNCTURE: CPT

## 2019-06-20 PROCEDURE — 74177 CT ABD & PELVIS W/CONTRAST: CPT

## 2019-06-20 RX ADMIN — IOHEXOL 50 ML: 240 INJECTION, SOLUTION INTRATHECAL; INTRAVASCULAR; INTRAVENOUS; ORAL at 12:14

## 2019-06-20 RX ADMIN — IOPAMIDOL 75 ML: 755 INJECTION, SOLUTION INTRAVENOUS at 12:14

## 2019-07-07 ENCOUNTER — HOSPITAL ENCOUNTER (OUTPATIENT)
Age: 74
Setting detail: OBSERVATION
Discharge: HOME | End: 2019-07-10
Attending: EMERGENCY MEDICINE | Admitting: HOSPITALIST
Payer: MEDICARE

## 2019-07-07 DIAGNOSIS — F51.01 PRIMARY INSOMNIA: ICD-10-CM

## 2019-07-07 DIAGNOSIS — K57.93 GASTROINTESTINAL HEMORRHAGE ASSOCIATED WITH INTESTINAL DIVERTICULITIS: Primary | ICD-10-CM

## 2019-07-07 PROBLEM — K57.92 DIVERTICULITIS: Status: ACTIVE | Noted: 2019-07-07

## 2019-07-07 LAB
A/G RATIO: 1.1 (ref 1.1–2.2)
ABO/RH: NORMAL
ALBUMIN SERPL-MCNC: 3.8 G/DL (ref 3.4–5)
ALP BLD-CCNC: 75 U/L (ref 40–129)
ALT SERPL-CCNC: 11 U/L (ref 10–40)
ANION GAP SERPL CALCULATED.3IONS-SCNC: 13 MMOL/L (ref 3–16)
ANTIBODY SCREEN: NORMAL
AST SERPL-CCNC: 29 U/L (ref 15–37)
BASOPHILS ABSOLUTE: 0.1 K/UL (ref 0–0.2)
BASOPHILS RELATIVE PERCENT: 0.8 %
BILIRUB SERPL-MCNC: 0.4 MG/DL (ref 0–1)
BUN BLDV-MCNC: 20 MG/DL (ref 7–20)
C DIFF TOXIN/ANTIGEN: NORMAL
CALCIUM SERPL-MCNC: 9 MG/DL (ref 8.3–10.6)
CHLORIDE BLD-SCNC: 96 MMOL/L (ref 99–110)
CO2: 23 MMOL/L (ref 21–32)
CREAT SERPL-MCNC: 0.9 MG/DL (ref 0.6–1.2)
EOSINOPHILS ABSOLUTE: 0.3 K/UL (ref 0–0.6)
EOSINOPHILS RELATIVE PERCENT: 3.8 %
GFR AFRICAN AMERICAN: >60
GFR NON-AFRICAN AMERICAN: >60
GLOBULIN: 3.6 G/DL
GLUCOSE BLD-MCNC: 138 MG/DL (ref 70–99)
HCT VFR BLD CALC: 32 % (ref 36–48)
HCT VFR BLD CALC: 34.2 % (ref 36–48)
HCT VFR BLD CALC: 34.7 % (ref 36–48)
HEMOGLOBIN: 10.5 G/DL (ref 12–16)
HEMOGLOBIN: 11.3 G/DL (ref 12–16)
HEMOGLOBIN: 11.4 G/DL (ref 12–16)
INR BLD: 1.32 (ref 0.86–1.14)
LYMPHOCYTES ABSOLUTE: 1.3 K/UL (ref 1–5.1)
LYMPHOCYTES RELATIVE PERCENT: 16.1 %
MCH RBC QN AUTO: 26.9 PG (ref 26–34)
MCHC RBC AUTO-ENTMCNC: 33.2 G/DL (ref 31–36)
MCV RBC AUTO: 81.1 FL (ref 80–100)
MONOCYTES ABSOLUTE: 0.7 K/UL (ref 0–1.3)
MONOCYTES RELATIVE PERCENT: 8.7 %
NEUTROPHILS ABSOLUTE: 5.6 K/UL (ref 1.7–7.7)
NEUTROPHILS RELATIVE PERCENT: 70.6 %
OCCULT BLOOD DIAGNOSTIC: ABNORMAL
PDW BLD-RTO: 14.1 % (ref 12.4–15.4)
PLATELET # BLD: 285 K/UL (ref 135–450)
PMV BLD AUTO: 7.8 FL (ref 5–10.5)
POTASSIUM SERPL-SCNC: 4.1 MMOL/L (ref 3.5–5.1)
PROTHROMBIN TIME: 15 SEC (ref 9.8–13)
RBC # BLD: 4.22 M/UL (ref 4–5.2)
SODIUM BLD-SCNC: 132 MMOL/L (ref 136–145)
TOTAL PROTEIN: 7.4 G/DL (ref 6.4–8.2)
WBC # BLD: 7.9 K/UL (ref 4–11)

## 2019-07-07 PROCEDURE — 86900 BLOOD TYPING SEROLOGIC ABO: CPT

## 2019-07-07 PROCEDURE — 87324 CLOSTRIDIUM AG IA: CPT

## 2019-07-07 PROCEDURE — 85610 PROTHROMBIN TIME: CPT

## 2019-07-07 PROCEDURE — 80053 COMPREHEN METABOLIC PANEL: CPT

## 2019-07-07 PROCEDURE — 96367 TX/PROPH/DG ADDL SEQ IV INF: CPT

## 2019-07-07 PROCEDURE — 96376 TX/PRO/DX INJ SAME DRUG ADON: CPT

## 2019-07-07 PROCEDURE — 2500000003 HC RX 250 WO HCPCS: Performed by: HOSPITALIST

## 2019-07-07 PROCEDURE — 96365 THER/PROPH/DIAG IV INF INIT: CPT

## 2019-07-07 PROCEDURE — 86901 BLOOD TYPING SEROLOGIC RH(D): CPT

## 2019-07-07 PROCEDURE — G0378 HOSPITAL OBSERVATION PER HR: HCPCS

## 2019-07-07 PROCEDURE — 85025 COMPLETE CBC W/AUTO DIFF WBC: CPT

## 2019-07-07 PROCEDURE — 2580000003 HC RX 258: Performed by: HOSPITALIST

## 2019-07-07 PROCEDURE — 6370000000 HC RX 637 (ALT 250 FOR IP): Performed by: HOSPITALIST

## 2019-07-07 PROCEDURE — 85018 HEMOGLOBIN: CPT

## 2019-07-07 PROCEDURE — 96375 TX/PRO/DX INJ NEW DRUG ADDON: CPT

## 2019-07-07 PROCEDURE — 36415 COLL VENOUS BLD VENIPUNCTURE: CPT

## 2019-07-07 PROCEDURE — 86850 RBC ANTIBODY SCREEN: CPT

## 2019-07-07 PROCEDURE — 96361 HYDRATE IV INFUSION ADD-ON: CPT

## 2019-07-07 PROCEDURE — C9113 INJ PANTOPRAZOLE SODIUM, VIA: HCPCS | Performed by: HOSPITALIST

## 2019-07-07 PROCEDURE — 99285 EMERGENCY DEPT VISIT HI MDM: CPT

## 2019-07-07 PROCEDURE — G0328 FECAL BLOOD SCRN IMMUNOASSAY: HCPCS

## 2019-07-07 PROCEDURE — 94760 N-INVAS EAR/PLS OXIMETRY 1: CPT

## 2019-07-07 PROCEDURE — 85014 HEMATOCRIT: CPT

## 2019-07-07 PROCEDURE — 87449 NOS EACH ORGANISM AG IA: CPT

## 2019-07-07 PROCEDURE — 96366 THER/PROPH/DIAG IV INF ADDON: CPT

## 2019-07-07 PROCEDURE — 6360000002 HC RX W HCPCS: Performed by: HOSPITALIST

## 2019-07-07 RX ORDER — ATORVASTATIN CALCIUM 10 MG/1
10 TABLET, FILM COATED ORAL DAILY
Status: DISCONTINUED | OUTPATIENT
Start: 2019-07-07 | End: 2019-07-10 | Stop reason: HOSPADM

## 2019-07-07 RX ORDER — MAGNESIUM SULFATE 1 G/100ML
1 INJECTION INTRAVENOUS PRN
Status: DISCONTINUED | OUTPATIENT
Start: 2019-07-07 | End: 2019-07-10 | Stop reason: HOSPADM

## 2019-07-07 RX ORDER — LEVOTHYROXINE SODIUM 0.03 MG/1
50 TABLET ORAL DAILY
Status: DISCONTINUED | OUTPATIENT
Start: 2019-07-07 | End: 2019-07-10 | Stop reason: HOSPADM

## 2019-07-07 RX ORDER — SODIUM CHLORIDE 9 MG/ML
INJECTION, SOLUTION INTRAVENOUS CONTINUOUS
Status: DISCONTINUED | OUTPATIENT
Start: 2019-07-07 | End: 2019-07-09

## 2019-07-07 RX ORDER — ROPINIROLE 0.25 MG/1
0.5 TABLET, FILM COATED ORAL NIGHTLY
Status: DISCONTINUED | OUTPATIENT
Start: 2019-07-07 | End: 2019-07-10 | Stop reason: HOSPADM

## 2019-07-07 RX ORDER — ACETAMINOPHEN 325 MG/1
650 TABLET ORAL EVERY 4 HOURS PRN
Status: DISCONTINUED | OUTPATIENT
Start: 2019-07-07 | End: 2019-07-10 | Stop reason: HOSPADM

## 2019-07-07 RX ORDER — PANTOPRAZOLE SODIUM 40 MG/10ML
40 INJECTION, POWDER, LYOPHILIZED, FOR SOLUTION INTRAVENOUS 2 TIMES DAILY
Status: DISCONTINUED | OUTPATIENT
Start: 2019-07-07 | End: 2019-07-10 | Stop reason: ALTCHOICE

## 2019-07-07 RX ORDER — POTASSIUM CHLORIDE 7.45 MG/ML
10 INJECTION INTRAVENOUS PRN
Status: DISCONTINUED | OUTPATIENT
Start: 2019-07-07 | End: 2019-07-10 | Stop reason: HOSPADM

## 2019-07-07 RX ORDER — SODIUM CHLORIDE 0.9 % (FLUSH) 0.9 %
10 SYRINGE (ML) INJECTION EVERY 12 HOURS SCHEDULED
Status: DISCONTINUED | OUTPATIENT
Start: 2019-07-07 | End: 2019-07-10 | Stop reason: HOSPADM

## 2019-07-07 RX ORDER — CIPROFLOXACIN 2 MG/ML
400 INJECTION, SOLUTION INTRAVENOUS EVERY 12 HOURS
Status: DISCONTINUED | OUTPATIENT
Start: 2019-07-07 | End: 2019-07-10 | Stop reason: ALTCHOICE

## 2019-07-07 RX ORDER — LANSOPRAZOLE 30 MG/1
30 CAPSULE, DELAYED RELEASE ORAL DAILY
Status: CANCELLED | OUTPATIENT
Start: 2019-07-07

## 2019-07-07 RX ORDER — SODIUM CHLORIDE 0.9 % (FLUSH) 0.9 %
10 SYRINGE (ML) INJECTION PRN
Status: DISCONTINUED | OUTPATIENT
Start: 2019-07-07 | End: 2019-07-10 | Stop reason: HOSPADM

## 2019-07-07 RX ORDER — POTASSIUM CHLORIDE 20 MEQ/1
40 TABLET, EXTENDED RELEASE ORAL PRN
Status: DISCONTINUED | OUTPATIENT
Start: 2019-07-07 | End: 2019-07-10 | Stop reason: HOSPADM

## 2019-07-07 RX ORDER — RANITIDINE HCL 75 MG
75 TABLET ORAL 2 TIMES DAILY
Status: CANCELLED | OUTPATIENT
Start: 2019-07-07

## 2019-07-07 RX ORDER — CLONIDINE HYDROCHLORIDE 0.1 MG/1
0.1 TABLET ORAL 2 TIMES DAILY
Status: DISCONTINUED | OUTPATIENT
Start: 2019-07-07 | End: 2019-07-10 | Stop reason: HOSPADM

## 2019-07-07 RX ORDER — ONDANSETRON 2 MG/ML
4 INJECTION INTRAMUSCULAR; INTRAVENOUS EVERY 6 HOURS PRN
Status: DISCONTINUED | OUTPATIENT
Start: 2019-07-07 | End: 2019-07-10 | Stop reason: HOSPADM

## 2019-07-07 RX ADMIN — METRONIDAZOLE 500 MG: 500 INJECTION, SOLUTION INTRAVENOUS at 19:59

## 2019-07-07 RX ADMIN — PANTOPRAZOLE SODIUM 40 MG: 40 INJECTION, POWDER, FOR SOLUTION INTRAVENOUS at 06:43

## 2019-07-07 RX ADMIN — SODIUM CHLORIDE: 9 INJECTION, SOLUTION INTRAVENOUS at 19:59

## 2019-07-07 RX ADMIN — CLONIDINE HYDROCHLORIDE 0.1 MG: 0.1 TABLET ORAL at 08:21

## 2019-07-07 RX ADMIN — METRONIDAZOLE 500 MG: 500 INJECTION, SOLUTION INTRAVENOUS at 07:58

## 2019-07-07 RX ADMIN — CIPROFLOXACIN 400 MG: 2 INJECTION, SOLUTION INTRAVENOUS at 06:42

## 2019-07-07 RX ADMIN — PANTOPRAZOLE SODIUM 40 MG: 40 INJECTION, POWDER, FOR SOLUTION INTRAVENOUS at 21:11

## 2019-07-07 RX ADMIN — LEVOTHYROXINE SODIUM 50 MCG: 25 TABLET ORAL at 08:21

## 2019-07-07 RX ADMIN — SODIUM CHLORIDE: 9 INJECTION, SOLUTION INTRAVENOUS at 06:42

## 2019-07-07 RX ADMIN — CLONIDINE HYDROCHLORIDE 0.1 MG: 0.1 TABLET ORAL at 21:11

## 2019-07-07 RX ADMIN — Medication 10 ML: at 21:11

## 2019-07-07 RX ADMIN — CIPROFLOXACIN 400 MG: 2 INJECTION, SOLUTION INTRAVENOUS at 21:10

## 2019-07-07 RX ADMIN — ROPINIROLE HYDROCHLORIDE 0.5 MG: 0.25 TABLET, FILM COATED ORAL at 21:11

## 2019-07-07 RX ADMIN — METRONIDAZOLE 500 MG: 500 INJECTION, SOLUTION INTRAVENOUS at 14:21

## 2019-07-07 RX ADMIN — ATORVASTATIN CALCIUM 10 MG: 10 TABLET, FILM COATED ORAL at 08:21

## 2019-07-07 ASSESSMENT — PAIN SCALES - GENERAL
PAINLEVEL_OUTOF10: 0
PAINLEVEL_OUTOF10: 0
PAINLEVEL_OUTOF10: 5
PAINLEVEL_OUTOF10: 0
PAINLEVEL_OUTOF10: 6
PAINLEVEL_OUTOF10: 0

## 2019-07-07 NOTE — CONSULTS
that she drinks about 1.8 oz of alcohol per week. DRUGS:   reports that she does not use drugs. Family History:       Problem Relation Age of Onset    Kidney Disease Mother     Diabetes Mother     Heart Disease Mother     Arthritis Mother     Cancer Father     Arthritis Father      REVIEW OF SYSTEMS:    A comprehensive 12 point review of systems is negative except as documented above. PHYSICAL EXAM:      Vitals:    /75   Pulse 69   Temp 97.7 °F (36.5 °C) (Oral)   Resp 14   Ht 5' 1\" (1.549 m)   Wt 142 lb 12.8 oz (64.8 kg)   SpO2 97%   BMI 26.98 kg/m²     General: No acute distress  HEENT: PERRL, EOMI, oral mucosa moist and intact, no sclericterus  Neck: no thyromegaly  Lymphatic: no cervical or supraclavicular lymphadenopathy  Heart: no m/r/g; +s1/s2 rrr  Lungs: CTA bilaterally  Abdomen: soft, nt, nd +BS  Extremities: 2+pulses, no edema  Skin: no rashes or lesions  Neuro: a&o x 3; no gross deficit  DATA:    Recent Labs     07/07/19 0315 07/07/19 0729   WBC 7.9  --    HGB 11.4* 11.3*   HCT 34.2* 34.7*   MCV 81.1  --      --      Recent Labs     07/07/19 0314   *   K 4.1   CL 96*   CO2 23   BUN 20   CREATININE 0.9     Recent Labs     07/07/19 0314   AST 29   ALT 11   BILITOT 0.4   ALKPHOS 75     No results for input(s): LIPASE, AMYLASE in the last 72 hours. Recent Labs     07/07/19 0314 07/07/19 0315   PROT 7.4  --    INR  --  1.32*     No results for input(s): PTT in the last 72 hours. No results for input(s): OCCULTBLD in the last 72 hours. IMPRESSION/RECOMMENDATIONS:      Melena  FOBT +  Recent diverticulitis    Hgb stable  EGD tomorrow  BID PPI  Colonoscopy in 4-6 weeks after diverticulitis flare      Thank you for allowing me to participate in [address] [place] [address]. Alex Quiroga.  MD Antonio

## 2019-07-07 NOTE — ED PROVIDER NOTES
MG tablet Take 1 tablet by mouth 2 times daily (before meals) 60 tablet 1    b complex vitamins capsule Take 1 capsule by mouth daily      Cranberry 1000 MG CAPS Take by mouth      atorvastatin (LIPITOR) 10 MG tablet TAKE 1 TABLET BY MOUTH ONE TIME A DAY 90 tablet 3    Magnesium 500 MG CAPS Take by mouth      levothyroxine (SYNTHROID) 50 MCG tablet TAKE ONE TABLET BY MOUTH DAILY 90 tablet 2    cloNIDine (CATAPRES) 0.1 MG tablet Take 0.1 mg by mouth 2 times daily       rOPINIRole (REQUIP) 0.5 MG tablet Take 0.5 mg by mouth nightly       Calcium Carbonate-Vit D-Min (CALCIUM 1200 PO) Take 1 tablet by mouth daily       Prenatal Vit-Fe Sulfate-FA (PRENATAL VITAMIN PO) Take 1 tablet by mouth daily       Cholecalciferol (VITAMIN D) 2000 UNITS CAPS capsule Take 1 capsule by mouth daily       cetirizine (ZYRTEC) 10 MG tablet Take 10 mg by mouth daily.       diltiazem (CARDIZEM CD) 120 MG extended release capsule Take 1 capsule by mouth daily 30 capsule 2    lisinopril (PRINIVIL;ZESTRIL) 5 MG tablet Take 1 tablet by mouth daily 30 tablet 2    Probiotic Product (PROBIOTIC DAILY PO) Take 1 tablet by mouth daily          ALLERGIES    Allergies   Allergen Reactions    Amlodipine Other (See Comments)     Leg swelling    Pravastatin Other (See Comments)     Muscle aches    Nitrofurantoin Nausea And Vomiting and Other (See Comments)     Body aches, chills    Sulfa Antibiotics Rash       FAMILY HISTORY    Family History   Problem Relation Age of Onset    Kidney Disease Mother     Diabetes Mother     Heart Disease Mother     Arthritis Mother     Cancer Father     Arthritis Father        SOCIAL HISTORY    Social History     Socioeconomic History    Marital status:      Spouse name: Damion Kulkarni Number of children: 3    Years of education: 15    Highest education level: None   Occupational History    Occupation:      Comment: retired   Social Needs    Financial resource strain: None    Food insecurity:     Worry: None     Inability: None    Transportation needs:     Medical: None     Non-medical: None   Tobacco Use    Smoking status: Never Smoker    Smokeless tobacco: Never Used   Substance and Sexual Activity    Alcohol use: Yes     Alcohol/week: 3.0 standard drinks     Types: 3 Glasses of wine per week     Comment: social    Drug use: No    Sexual activity: Yes     Partners: Male   Lifestyle    Physical activity:     Days per week: None     Minutes per session: None    Stress: None   Relationships    Social connections:     Talks on phone: None     Gets together: None     Attends Ca[employer] service: None     Active member of club or organization: None     Attends meetings of clubs or organizations: None     Relationship status: None    Intimate partner violence:     Fear of current or ex partner: None     Emotionally abused: None     Physically abused: None     Forced sexual activity: None   Other Topics Concern    None   Social History Narrative    None       REVIEW OF SYSTEMS    Constitutional:  Denies fever, chills, weight loss or weakness   Eyes:  Denies photophobia or discharge   HENT:  Denies sore throat or ear pain   Respiratory:  Denies cough or shortness of breath   Cardiovascular:  Denies chest pain, palpitations or swelling   GI:  Denies abdominal pain, nausea, vomiting, or diarrhea   Musculoskeletal:  Denies back pain   Skin:  Denies rash   Neurologic:  Denies headache, focal weakness or sensory changes   Endocrine:  Denies polyuria or polydypsia   Lymphatic:  Denies swollen glands   Psychiatric:  Denies depression, suicidal ideation or homicidal ideation   All systems negative except as marked.      PHYSICAL EXAM    VITAL SIGNS: /61   Pulse 64   Temp 97.8 °F (36.6 °C) (Oral)   Resp 16   Ht 5' 1\" (1.549 m)   Wt 150 lb 4.8 oz (68.2 kg)   SpO2 99%   BMI 28.40 kg/m²    Constitutional:  Well developed, Well nourished, No acute distress, Non-toxic Narrative:     Performed at:  [facility] [address] [place]   Phone [phone]   BLOOD OCCULT STOOL DIAGNOSTIC - Abnormal; Notable for the following components:    Occult Blood Diagnostic   (*)     Value: Result: POSITIVE  Normal range: Negative      All other components within normal limits    Narrative:     ORDER#: [id number]                          ORDERED BY: JAMES CALDWELL  SOURCE: Stool Loose                        COLLECTED:  07/07/19 03:41  ANTIBIOTICS AT GREGORY.:                      RECEIVED :  07/07/19 03:43  Performed at:  [facility] [address]   Phone [phone]   HEMOGLOBIN AND HEMATOCRIT, BLOOD - Abnormal; Notable for the following components:    Hemoglobin 11.3 (*)     Hematocrit 34.7 (*)     All other components within normal limits    Narrative:     Performed at:  [facility] [address], [facility] [address]   Phone [phone]   HEMOGLOBIN AND HEMATOCRIT, BLOOD - Abnormal; Notable for the following components:    Hemoglobin 10.5 (*)     Hematocrit 32.0 (*)     All other components within normal limits    Narrative:     Performed at:  [facility] [address], [place]   Phone [phone]   HEMOGLOBIN AND HEMATOCRIT, BLOOD - Abnormal; Notable for the following components:    Hemoglobin 10.8 (*)     Hematocrit 32.7 (*)     All other components within normal limits    Narrative:     Performed at:  [facility] [address], [place]   Phone [phone]   CBC WITH AUTO DIFFERENTIAL - Abnormal; Notable for the following components:    RBC 3.84 (*)     Hemoglobin 10.3 (*)     Hematocrit 31.8 (*)     All other components within normal limits    Narrative:     Collection has been rescheduled by [facility] at 07/08/2019 05:28.  Reason:   Patient in Hemoglobin is 8.6 she has not had any bright red bleeding here in the emergency department. FINAL IMPRESSION    1. Gastrointestinal hemorrhage associated with intestinal diverticulitis    2.  Primary insomnia            Misha Mckinnon MD  07/15/19 8040

## 2019-07-07 NOTE — H&P
_    [facility]    7/7/2019 5:30 AM        Patient Information:  Raquel Gunn is a 74 y.o. female [id number]    PCP:  Cresencio Patel (Tel: None )        Date of Service:   Pt seen/examined on 7/7/2019   Placed in Observation. Chief complaint:    Chief Complaint   Patient presents with    Diarrhea     pt states she had an outpatient CT and was told she has diverticulitis, on abx. states this AM around 0100 she began with black diarrhea, pt very anxious and tearful in triage       History of Present Illness:  Esperanza Powell is a 74 y.o. female who presented with   · Brought to ED  : by  Family    · Recently Dxed of Diverticulitis by PCP and placed on PO Abx , w/ Flagyl and Ciprofloxacin , starting 7/3/19   · Has been taking Abx since then   · Reported s/s of N/V and lower abd pain initially , got CT done per Oncology for some reflux symptoms , saw PCP, started on Abx on 7/3/19 . Started having diarrhea w/ Black Tarry stools now starting afternoon of 7/6/19 . Has had 3 episodes at home . Called on call PCP . Recommended to come to ED . Has had 3 episodes of Black tarry stools since ED arrival .    · Is supposed to see GI as OP later this month        History obtained from   · Patient      · Prior chart  As well       So far, ED Findings/Workup/Labs shows :   · BP. Stable     · HR. Stable     · Saturations. Stable    · Temperature. Afebrile       · Pertinent Abnormal Labs and assessment as below. While in ED, patient care and medications given :    · None       Currently, on my evaluation, patient is :   · Since arrival, post above Rx, patient is AO X 3   · VSS   · No chest pain   · Mild lower Abd pain +       REVIEW OF SYSTEMS:     Constitutional:  Negative for fever, chills or night sweats  ENT:   Negative for rhinorrhea, epistaxis, hoarseness, sore throat.   Respiratory:   Negative for shortness of breath, wheezing  Cardiovascular:   Negative for  chest pain, Cholecalciferol (VITAMIN D) 2000 UNITS CAPS capsule Take 1 capsule by mouth daily       cetirizine (ZYRTEC) 10 MG tablet Take 10 mg by mouth daily.  benzonatate (TESSALON PERLES) 100 MG capsule Take 1 capsule by mouth 3 times daily as needed for Cough 30 capsule 0    budesonide-formoterol (SYMBICORT) 160-4.5 MCG/ACT AERO Inhale 2 puffs into the lungs 2 times daily 1 Inhaler 4    gabapentin (NEURONTIN) 300 MG capsule 100 mg daily      triamcinolone (KENALOG) 0.1 % ointment Apply topically           Allergies: Allergies   Allergen Reactions    Amlodipine Other (See Comments)     Leg swelling    Pravastatin Other (See Comments)     Muscle aches    Nitrofurantoin Nausea And Vomiting and Other (See Comments)     Body aches, chills    Sulfa Antibiotics Rash          Social History:   reports that she has never smoked. She has never used smokeless tobacco. She reports that she drinks about 1.8 oz of alcohol per week. She reports that she does not use drugs. Family History:            Problem Relation Age of Onset    Kidney Disease Mother     Diabetes Mother     Heart Disease Mother     Arthritis Mother     Cancer Father     Arthritis Father            Physical Exam:  /64   Pulse 93   Temp 97.5 °F (36.4 °C)   Resp 16   Ht 5' 1\" (1.549 m)   Wt 144 lb (65.3 kg)   SpO2 95%   BMI 27.21 kg/m²     General appearance: Appears  comfortable   Eyes:  Sclera clear, pupils equal  ENT:  Moist mucus membranes, Trachea midline. Cardiovascular: Regular rhythm, normal S1, S2. No murmur, gallop, rub. No edema in lower extremities   Respiratory:  Noted Clear to auscultation bilaterally,  No wheeze, good inspiratory effort   Gastrointestinal:  Abdomen soft, + Mild tender in Lower abd , L > R ,  not distended,  normal bowel sounds   Musculoskeletal:  No cyanosis in digits, neck supple  Neurology:  Cranial nerves grossly intact. Alert and oriented in time, place and person.  No speech or motor deficits Psychiatry:  Appropriate affect. Not agitated  Skin:  Warm, dry, normal turgor, no rash       Labs:     Recent Labs     07/07/19 0315   WBC 7.9   HGB 11.4*   HCT 34.2*        Recent Labs     07/07/19 0314   *   K 4.1   CL 96*   CO2 23   BUN 20   CREATININE 0.9   CALCIUM 9.0     Recent Labs     07/07/19 0314   AST 29   ALT 11   BILITOT 0.4   ALKPHOS 75     Recent Labs     07/07/19 0315   INR 1.32*     No results for input(s): Jordan Allison in the last 72 hours. Urinalysis:      Lab Results   Component Value Date    NITRU Negative 06/08/2018    WBCUA >100 06/08/2018    BACTERIA Rare 06/08/2018    RBCUA 10-20 06/08/2018    BLOODU LARGE 06/08/2018    SPECGRAV 1.007 06/08/2018    GLUCOSEU Negative 06/08/2018         Radiology:       IMAGING :     No orders to display         ASSESSMENT / Jennifer Liz Cedeno [address] Problems    Diagnosis Date Noted    Diverticulitis [K57.92] 07/07/2019    Gastroesophageal reflux disease [K21.9] 11/29/2018    Hypothyroid [E03.9] 06/06/2017    Gastroesophageal reflux disease without esophagitis [K21.9] 06/06/2017    MGUS (monoclonal gammopathy of unknown significance) [D47.2] 05/04/2016    Intestinal malabsorption [K90.9] 11/19/2014    Anemia [D64.9] 10/29/2014    Essential hypertension, benign [I10] 04/30/2014    Mixed hyperlipidemia [E78.2] 04/30/2014         · Melena , ? 2/2 Old diverticular Bleed vs Upper GI Bleed 2/2 Gastritis vs esophagitis vs PUD : keep NPO. Monitor H/H . PPI BID. GI C/s in AM . Trend Hb closely   · Acute diverticulitis , on PO Abx : changed to IV Abx while in house and NPO. GI c/s . Recent CT scan noted from OP showing Diverticulitis changes as well. · ? MGUS : f/u w/ Oncology as OP . CT scan done as OP recently per Oncology. · Essential Hypertension : Will monitor and adjust medications prn. For now, resumed home medications of Clonidine   · Mixed Hyperlipidemia : Will monitor and adjust medications prn.  For now, resumed home

## 2019-07-08 ENCOUNTER — ANESTHESIA EVENT (OUTPATIENT)
Dept: ENDOSCOPY | Age: 74
End: 2019-07-08
Payer: MEDICARE

## 2019-07-08 ENCOUNTER — ANESTHESIA (OUTPATIENT)
Dept: ENDOSCOPY | Age: 74
End: 2019-07-08
Payer: MEDICARE

## 2019-07-08 VITALS
DIASTOLIC BLOOD PRESSURE: 81 MMHG | SYSTOLIC BLOOD PRESSURE: 189 MMHG | OXYGEN SATURATION: 99 % | RESPIRATION RATE: 15 BRPM

## 2019-07-08 LAB
A/G RATIO: 1.2 (ref 1.1–2.2)
ALBUMIN SERPL-MCNC: 3.6 G/DL (ref 3.4–5)
ALP BLD-CCNC: 67 U/L (ref 40–129)
ALT SERPL-CCNC: 13 U/L (ref 10–40)
ANION GAP SERPL CALCULATED.3IONS-SCNC: 14 MMOL/L (ref 3–16)
AST SERPL-CCNC: 26 U/L (ref 15–37)
BASOPHILS ABSOLUTE: 0.2 K/UL (ref 0–0.2)
BASOPHILS RELATIVE PERCENT: 2.6 %
BILIRUB SERPL-MCNC: 0.3 MG/DL (ref 0–1)
BUN BLDV-MCNC: 10 MG/DL (ref 7–20)
CALCIUM SERPL-MCNC: 8.4 MG/DL (ref 8.3–10.6)
CHLORIDE BLD-SCNC: 103 MMOL/L (ref 99–110)
CO2: 19 MMOL/L (ref 21–32)
CREAT SERPL-MCNC: 0.9 MG/DL (ref 0.6–1.2)
EKG ATRIAL RATE: 133 BPM
EKG DIAGNOSIS: NORMAL
EKG Q-T INTERVAL: 318 MS
EKG QRS DURATION: 86 MS
EKG QTC CALCULATION (BAZETT): 471 MS
EKG R AXIS: -12 DEGREES
EKG T AXIS: 41 DEGREES
EKG VENTRICULAR RATE: 132 BPM
EOSINOPHILS ABSOLUTE: 0.4 K/UL (ref 0–0.6)
EOSINOPHILS RELATIVE PERCENT: 4.3 %
GFR AFRICAN AMERICAN: >60
GFR NON-AFRICAN AMERICAN: >60
GLOBULIN: 2.9 G/DL
GLUCOSE BLD-MCNC: 113 MG/DL (ref 70–99)
HCT VFR BLD CALC: 30.4 % (ref 36–48)
HCT VFR BLD CALC: 31.8 % (ref 36–48)
HCT VFR BLD CALC: 32.2 % (ref 36–48)
HCT VFR BLD CALC: 32.7 % (ref 36–48)
HEMOGLOBIN: 10 G/DL (ref 12–16)
HEMOGLOBIN: 10.3 G/DL (ref 12–16)
HEMOGLOBIN: 10.3 G/DL (ref 12–16)
HEMOGLOBIN: 10.8 G/DL (ref 12–16)
LYMPHOCYTES ABSOLUTE: 1.4 K/UL (ref 1–5.1)
LYMPHOCYTES RELATIVE PERCENT: 16.6 %
MAGNESIUM: 1.9 MG/DL (ref 1.8–2.4)
MCH RBC QN AUTO: 26.8 PG (ref 26–34)
MCHC RBC AUTO-ENTMCNC: 32.4 G/DL (ref 31–36)
MCV RBC AUTO: 82.9 FL (ref 80–100)
MONOCYTES ABSOLUTE: 0.7 K/UL (ref 0–1.3)
MONOCYTES RELATIVE PERCENT: 8.7 %
NEUTROPHILS ABSOLUTE: 5.7 K/UL (ref 1.7–7.7)
NEUTROPHILS RELATIVE PERCENT: 67.8 %
PDW BLD-RTO: 14.8 % (ref 12.4–15.4)
PLATELET # BLD: 276 K/UL (ref 135–450)
PMV BLD AUTO: 8 FL (ref 5–10.5)
POTASSIUM SERPL-SCNC: 4.2 MMOL/L (ref 3.5–5.1)
RBC # BLD: 3.84 M/UL (ref 4–5.2)
SODIUM BLD-SCNC: 136 MMOL/L (ref 136–145)
T4 FREE: 1.6 NG/DL (ref 0.9–1.8)
TOTAL PROTEIN: 6.5 G/DL (ref 6.4–8.2)
TSH REFLEX: 5.45 UIU/ML (ref 0.27–4.2)
WBC # BLD: 8.5 K/UL (ref 4–11)

## 2019-07-08 PROCEDURE — 94760 N-INVAS EAR/PLS OXIMETRY 1: CPT

## 2019-07-08 PROCEDURE — 85025 COMPLETE CBC W/AUTO DIFF WBC: CPT

## 2019-07-08 PROCEDURE — 6360000002 HC RX W HCPCS: Performed by: NURSE ANESTHETIST, CERTIFIED REGISTERED

## 2019-07-08 PROCEDURE — 80053 COMPREHEN METABOLIC PANEL: CPT

## 2019-07-08 PROCEDURE — 99215 OFFICE O/P EST HI 40 MIN: CPT | Performed by: INTERNAL MEDICINE

## 2019-07-08 PROCEDURE — 2500000003 HC RX 250 WO HCPCS: Performed by: HOSPITALIST

## 2019-07-08 PROCEDURE — 96375 TX/PRO/DX INJ NEW DRUG ADDON: CPT

## 2019-07-08 PROCEDURE — 96366 THER/PROPH/DIAG IV INF ADDON: CPT

## 2019-07-08 PROCEDURE — 6370000000 HC RX 637 (ALT 250 FOR IP): Performed by: INTERNAL MEDICINE

## 2019-07-08 PROCEDURE — 83735 ASSAY OF MAGNESIUM: CPT

## 2019-07-08 PROCEDURE — 6360000002 HC RX W HCPCS: Performed by: INTERNAL MEDICINE

## 2019-07-08 PROCEDURE — C9113 INJ PANTOPRAZOLE SODIUM, VIA: HCPCS | Performed by: INTERNAL MEDICINE

## 2019-07-08 PROCEDURE — 93005 ELECTROCARDIOGRAM TRACING: CPT | Performed by: HOSPITALIST

## 2019-07-08 PROCEDURE — G0378 HOSPITAL OBSERVATION PER HR: HCPCS

## 2019-07-08 PROCEDURE — 85018 HEMOGLOBIN: CPT

## 2019-07-08 PROCEDURE — 2500000003 HC RX 250 WO HCPCS: Performed by: NURSE PRACTITIONER

## 2019-07-08 PROCEDURE — 36415 COLL VENOUS BLD VENIPUNCTURE: CPT

## 2019-07-08 PROCEDURE — 2580000003 HC RX 258: Performed by: NURSE ANESTHETIST, CERTIFIED REGISTERED

## 2019-07-08 PROCEDURE — 2580000003 HC RX 258

## 2019-07-08 PROCEDURE — 2500000003 HC RX 250 WO HCPCS: Performed by: NURSE ANESTHETIST, CERTIFIED REGISTERED

## 2019-07-08 PROCEDURE — 96376 TX/PRO/DX INJ SAME DRUG ADON: CPT

## 2019-07-08 PROCEDURE — 88342 IMHCHEM/IMCYTCHM 1ST ANTB: CPT

## 2019-07-08 PROCEDURE — 2580000003 HC RX 258: Performed by: INTERNAL MEDICINE

## 2019-07-08 PROCEDURE — 2500000003 HC RX 250 WO HCPCS: Performed by: INTERNAL MEDICINE

## 2019-07-08 PROCEDURE — APPSS45 APP SPLIT SHARED TIME 31-45 MINUTES: Performed by: NURSE PRACTITIONER

## 2019-07-08 PROCEDURE — 3609012400 HC EGD TRANSORAL BIOPSY SINGLE/MULTIPLE: Performed by: INTERNAL MEDICINE

## 2019-07-08 PROCEDURE — 7100000000 HC PACU RECOVERY - FIRST 15 MIN: Performed by: INTERNAL MEDICINE

## 2019-07-08 PROCEDURE — 84439 ASSAY OF FREE THYROXINE: CPT

## 2019-07-08 PROCEDURE — 84443 ASSAY THYROID STIM HORMONE: CPT

## 2019-07-08 PROCEDURE — 85014 HEMATOCRIT: CPT

## 2019-07-08 PROCEDURE — 7100000001 HC PACU RECOVERY - ADDTL 15 MIN: Performed by: INTERNAL MEDICINE

## 2019-07-08 PROCEDURE — 2709999900 HC NON-CHARGEABLE SUPPLY: Performed by: INTERNAL MEDICINE

## 2019-07-08 PROCEDURE — 88305 TISSUE EXAM BY PATHOLOGIST: CPT

## 2019-07-08 PROCEDURE — 3700000000 HC ANESTHESIA ATTENDED CARE: Performed by: INTERNAL MEDICINE

## 2019-07-08 PROCEDURE — 93010 ELECTROCARDIOGRAM REPORT: CPT | Performed by: INTERNAL MEDICINE

## 2019-07-08 PROCEDURE — 96361 HYDRATE IV INFUSION ADD-ON: CPT

## 2019-07-08 RX ORDER — LIDOCAINE HYDROCHLORIDE 20 MG/ML
INJECTION, SOLUTION INFILTRATION; PERINEURAL PRN
Status: DISCONTINUED | OUTPATIENT
Start: 2019-07-08 | End: 2019-07-08 | Stop reason: SDUPTHER

## 2019-07-08 RX ORDER — DILTIAZEM HYDROCHLORIDE 5 MG/ML
22 INJECTION INTRAVENOUS ONCE
Status: DISCONTINUED | OUTPATIENT
Start: 2019-07-08 | End: 2019-07-08

## 2019-07-08 RX ORDER — SODIUM CHLORIDE 0.9 % (FLUSH) 0.9 %
SYRINGE (ML) INJECTION
Status: COMPLETED
Start: 2019-07-08 | End: 2019-07-08

## 2019-07-08 RX ORDER — SODIUM CHLORIDE 9 MG/ML
INJECTION, SOLUTION INTRAVENOUS CONTINUOUS PRN
Status: DISCONTINUED | OUTPATIENT
Start: 2019-07-08 | End: 2019-07-08 | Stop reason: SDUPTHER

## 2019-07-08 RX ORDER — PROPOFOL 10 MG/ML
INJECTION, EMULSION INTRAVENOUS PRN
Status: DISCONTINUED | OUTPATIENT
Start: 2019-07-08 | End: 2019-07-08 | Stop reason: SDUPTHER

## 2019-07-08 RX ORDER — DILTIAZEM HYDROCHLORIDE 5 MG/ML
20 INJECTION INTRAVENOUS ONCE
Status: DISCONTINUED | OUTPATIENT
Start: 2019-07-08 | End: 2019-07-08

## 2019-07-08 RX ORDER — DILTIAZEM HYDROCHLORIDE 5 MG/ML
0.25 INJECTION INTRAVENOUS ONCE
Status: COMPLETED | OUTPATIENT
Start: 2019-07-08 | End: 2019-07-08

## 2019-07-08 RX ADMIN — Medication: at 23:49

## 2019-07-08 RX ADMIN — PROPOFOL 60 MG: 10 INJECTION, EMULSION INTRAVENOUS at 09:01

## 2019-07-08 RX ADMIN — SODIUM CHLORIDE: 9 INJECTION, SOLUTION INTRAVENOUS at 08:45

## 2019-07-08 RX ADMIN — CLONIDINE HYDROCHLORIDE 0.1 MG: 0.1 TABLET ORAL at 10:43

## 2019-07-08 RX ADMIN — PANTOPRAZOLE SODIUM 40 MG: 40 INJECTION, POWDER, FOR SOLUTION INTRAVENOUS at 10:44

## 2019-07-08 RX ADMIN — PANTOPRAZOLE SODIUM 40 MG: 40 INJECTION, POWDER, FOR SOLUTION INTRAVENOUS at 23:26

## 2019-07-08 RX ADMIN — CLONIDINE HYDROCHLORIDE 0.1 MG: 0.1 TABLET ORAL at 23:26

## 2019-07-08 RX ADMIN — Medication 10 ML: at 10:44

## 2019-07-08 RX ADMIN — METRONIDAZOLE 500 MG: 500 INJECTION, SOLUTION INTRAVENOUS at 08:21

## 2019-07-08 RX ADMIN — LEVOTHYROXINE SODIUM 50 MCG: 25 TABLET ORAL at 10:43

## 2019-07-08 RX ADMIN — METRONIDAZOLE 500 MG: 500 INJECTION, SOLUTION INTRAVENOUS at 15:10

## 2019-07-08 RX ADMIN — METRONIDAZOLE 500 MG: 500 INJECTION, SOLUTION INTRAVENOUS at 23:25

## 2019-07-08 RX ADMIN — DILTIAZEM HYDROCHLORIDE 16 MG: 5 INJECTION INTRAVENOUS at 01:45

## 2019-07-08 RX ADMIN — CIPROFLOXACIN 400 MG: 2 INJECTION, SOLUTION INTRAVENOUS at 10:49

## 2019-07-08 RX ADMIN — ATORVASTATIN CALCIUM 10 MG: 10 TABLET, FILM COATED ORAL at 10:43

## 2019-07-08 RX ADMIN — PROPOFOL 60 MG: 10 INJECTION, EMULSION INTRAVENOUS at 09:09

## 2019-07-08 RX ADMIN — LIDOCAINE HYDROCHLORIDE 100 MG: 20 INJECTION, SOLUTION INFILTRATION; PERINEURAL at 09:02

## 2019-07-08 RX ADMIN — METRONIDAZOLE 500 MG: 500 INJECTION, SOLUTION INTRAVENOUS at 01:45

## 2019-07-08 RX ADMIN — PROPOFOL 60 MG: 10 INJECTION, EMULSION INTRAVENOUS at 09:05

## 2019-07-08 RX ADMIN — SODIUM CHLORIDE: 9 INJECTION, SOLUTION INTRAVENOUS at 10:49

## 2019-07-08 RX ADMIN — ROPINIROLE HYDROCHLORIDE 0.5 MG: 0.25 TABLET, FILM COATED ORAL at 23:25

## 2019-07-08 ASSESSMENT — ENCOUNTER SYMPTOMS: SHORTNESS OF BREATH: 0

## 2019-07-08 ASSESSMENT — PAIN DESCRIPTION - PAIN TYPE: TYPE: ACUTE PAIN

## 2019-07-08 ASSESSMENT — PAIN SCALES - GENERAL
PAINLEVEL_OUTOF10: 0
PAINLEVEL_OUTOF10: 0
PAINLEVEL_OUTOF10: 7
PAINLEVEL_OUTOF10: 0

## 2019-07-08 ASSESSMENT — PAIN DESCRIPTION - LOCATION: LOCATION: HEAD

## 2019-07-08 ASSESSMENT — PAIN DESCRIPTION - ORIENTATION: ORIENTATION: POSTERIOR

## 2019-07-08 NOTE — PROGRESS NOTES
Resting in bed. No signs of distress, labored breathing or pain noted. Assessment complete. VS stable. All needs met at this time. Bed in lowest position. Call light within reach.

## 2019-07-08 NOTE — CONSULTS
7/8/2019 1351  Last data filed at 7/8/2019 1054  Gross per 24 hour   Intake 2200 ml   Output 2400 ml   Net -200 ml       · Telemetry: Sinus rhythm   · Constitutional: Oriented. No distress. · Head: Normocephalic and atraumatic. · Mouth/Throat: Oropharynx is clear and moist.   · Eyes: Conjunctivae normal. EOM are normal.   · Neck: Neck supple. No rigidity. No JVD present. · Cardiovascular: Normal rate, regular rhythm, S1&S2. · Pulmonary/Chest: Bilateral respiratory sounds. No wheezes, No rhonchi. · Abdominal: Soft. Bowel sounds present. No distension, No tenderness. · Musculoskeletal: No tenderness. No edema    · Lymphadenopathy: Has no cervical adenopathy. · Neurological: Alert and oriented. Cranial nerve appears intact, No Gross deficit   · Skin: Skin is warm and dry. No rash noted. · Psychiatric: Has a normal behavior     Labs, diagnostic and imaging results reviewed. Reviewed. Recent Labs     07/07/19  0314 07/08/19  0544   * 136   K 4.1 4.2   CL 96* 103   CO2 23 19*   BUN 20 10   CREATININE 0.9 0.9     Recent Labs     07/07/19  0315  07/07/19  1529 07/07/19  2356 07/08/19  0543   WBC 7.9  --   --   --  8.5   HGB 11.4*   < > 10.5* 10.8* 10.3*   HCT 34.2*   < > 32.0* 32.7* 31.8*   MCV 81.1  --   --   --  82.9     --   --   --  276    < > = values in this interval not displayed.      Lab Results   Component Value Date    TROPONINI <0.01 06/05/2017     No results found for: BNP  Lab Results   Component Value Date    PROTIME 15.0 07/07/2019    PROTIME 11.9 12/07/2018    PROTIME 11.9 06/09/2018    INR 1.32 07/07/2019    INR 1.04 12/07/2018    INR 1.04 06/09/2018     Lab Results   Component Value Date    CHOL 164 01/17/2017    HDL 59 01/17/2017    HDL 57 01/12/2012    TRIG 130 01/17/2017       ECG: Afib with RVR   Echo: 12/2018:   Normal left ventricle size, wall thickness, and systolic function with an   estimated ejection fraction of 55%.     Mild mitral annular calcification is hyperlipidemia [E78.2] 04/30/2014       Assessment and plan:    -Paroxysmal atrial fibrillation   Brief episode of paroxysmal atrial fibrillation, converted to sinus rhythm. Patient reports prior episodes of brief fluttering sensation, ? PAF or other arrhythmias. On PO Cardizem. - Atrial fibrillation, risk factors including age, HTN, obesity, inactivity, URSULA, etc were discussed with patient. Aggressive risk factor modification is recommended. Check TSH   Echocardiography     Discussed anticoagulation with patient if she has recurrent episodes of atrial fibrillation. Risks benefits and alternatives were discussed with patient. She is had recent GI bleeding therefore will hold off anticoagulation for now. Discussed outpatient Holter monitoring with patient. Will provide 30-day outpatient Holter monitor on discharge. GI consult opinion on when anticoagulation can be started     URSULA is a strong risk factor for AF. Recommend referral to sleep study as outpatient. HTN: Controlled. Hypothyroidism:    TSH   On synthroid. Thank you for allowing me to participate in the care of Fern Cambria     All questions and concerns were addressed to the patient/family. Alternatives to my treatment were discussed. I have discussed the above stated plan and the patient verbalized understanding and agreed with the plan. NOTE: This report was transcribed using voice recognition software. Every effort was made to ensure accuracy, however, inadvertent computerized transcription errors may be present.      Jojo Sellers MD, MPH  [place] [address]   Office: [phone]

## 2019-07-08 NOTE — PROGRESS NOTES
Re-assessment complete, see doc flow sheet. VSS. Pt alert and oriented x4, ambulating in her room independently and tolerating well. Started on a low fiber diet this AM following EGD. Continues to have loose black tarry stools, denies any abdominal pain or nausea at this time. Abdomen soft, bowel sounds active. AM medications given as ordered, see MAR. Pt provided with bath supplies, says she wishes to get cleaned up on her own. Denies any further needs at this time, will continue to monitor.

## 2019-07-08 NOTE — PROGRESS NOTES
Pt flipped into a-fib with elevated heart rates up into the 170s. Pt is asymptomatic with no c/o of heart palpitations, dizziness or pain. Pt alert and oriented and answers all questions appropriately. NP sent a perfect serve. This nurse was informed to get STAT EKG. EKG came back negative and results given to NP. A one time dose of Cardizem 16 mg via IVP ordered and given. VS stable and heart rate has gone done down. Treatment effective. Resting in bed with no signs of distress, labored breathing or pain noted. Call light within reach.

## 2019-07-09 ENCOUNTER — NURSE ONLY (OUTPATIENT)
Dept: CARDIOLOGY CLINIC | Age: 74
End: 2019-07-09
Payer: MEDICARE

## 2019-07-09 DIAGNOSIS — I10 ESSENTIAL HYPERTENSION, BENIGN: ICD-10-CM

## 2019-07-09 DIAGNOSIS — I48.0 PAF (PAROXYSMAL ATRIAL FIBRILLATION) (HCC): ICD-10-CM

## 2019-07-09 DIAGNOSIS — Z51.81 ENCOUNTER FOR MONITORING ACE-INHIBITOR THERAPY: ICD-10-CM

## 2019-07-09 DIAGNOSIS — Z79.899 ENCOUNTER FOR MONITORING ACE-INHIBITOR THERAPY: ICD-10-CM

## 2019-07-09 DIAGNOSIS — I48.0 PAF (PAROXYSMAL ATRIAL FIBRILLATION) (HCC): Primary | ICD-10-CM

## 2019-07-09 LAB
ALBUMIN SERPL-MCNC: 3.5 G/DL (ref 3.4–5)
ANION GAP SERPL CALCULATED.3IONS-SCNC: 12 MMOL/L (ref 3–16)
BASOPHILS ABSOLUTE: 0.2 K/UL (ref 0–0.2)
BASOPHILS RELATIVE PERCENT: 2.2 %
BUN BLDV-MCNC: 5 MG/DL (ref 7–20)
CALCIUM SERPL-MCNC: 8.3 MG/DL (ref 8.3–10.6)
CHLORIDE BLD-SCNC: 105 MMOL/L (ref 99–110)
CO2: 18 MMOL/L (ref 21–32)
CREAT SERPL-MCNC: 0.8 MG/DL (ref 0.6–1.2)
EOSINOPHILS ABSOLUTE: 0.4 K/UL (ref 0–0.6)
EOSINOPHILS RELATIVE PERCENT: 5.9 %
GFR AFRICAN AMERICAN: >60
GFR NON-AFRICAN AMERICAN: >60
GLUCOSE BLD-MCNC: 114 MG/DL (ref 70–99)
HCT VFR BLD CALC: 27.8 % (ref 36–48)
HEMOGLOBIN: 9.2 G/DL (ref 12–16)
LYMPHOCYTES ABSOLUTE: 1.3 K/UL (ref 1–5.1)
LYMPHOCYTES RELATIVE PERCENT: 18.7 %
MCH RBC QN AUTO: 27.1 PG (ref 26–34)
MCHC RBC AUTO-ENTMCNC: 33.3 G/DL (ref 31–36)
MCV RBC AUTO: 81.5 FL (ref 80–100)
MONOCYTES ABSOLUTE: 0.6 K/UL (ref 0–1.3)
MONOCYTES RELATIVE PERCENT: 9.1 %
NEUTROPHILS ABSOLUTE: 4.6 K/UL (ref 1.7–7.7)
NEUTROPHILS RELATIVE PERCENT: 64.1 %
PDW BLD-RTO: 14.7 % (ref 12.4–15.4)
PHOSPHORUS: 3.2 MG/DL (ref 2.5–4.9)
PLATELET # BLD: 256 K/UL (ref 135–450)
PMV BLD AUTO: 7.8 FL (ref 5–10.5)
POTASSIUM SERPL-SCNC: 3.6 MMOL/L (ref 3.5–5.1)
RBC # BLD: 3.41 M/UL (ref 4–5.2)
SODIUM BLD-SCNC: 135 MMOL/L (ref 136–145)
WBC # BLD: 7.1 K/UL (ref 4–11)

## 2019-07-09 PROCEDURE — 96361 HYDRATE IV INFUSION ADD-ON: CPT

## 2019-07-09 PROCEDURE — 96366 THER/PROPH/DIAG IV INF ADDON: CPT

## 2019-07-09 PROCEDURE — 99233 SBSQ HOSP IP/OBS HIGH 50: CPT | Performed by: NURSE PRACTITIONER

## 2019-07-09 PROCEDURE — C9113 INJ PANTOPRAZOLE SODIUM, VIA: HCPCS | Performed by: INTERNAL MEDICINE

## 2019-07-09 PROCEDURE — 80069 RENAL FUNCTION PANEL: CPT

## 2019-07-09 PROCEDURE — 6370000000 HC RX 637 (ALT 250 FOR IP): Performed by: INTERNAL MEDICINE

## 2019-07-09 PROCEDURE — 36415 COLL VENOUS BLD VENIPUNCTURE: CPT

## 2019-07-09 PROCEDURE — 2500000003 HC RX 250 WO HCPCS: Performed by: SURGERY

## 2019-07-09 PROCEDURE — 6360000002 HC RX W HCPCS: Performed by: INTERNAL MEDICINE

## 2019-07-09 PROCEDURE — G0378 HOSPITAL OBSERVATION PER HR: HCPCS

## 2019-07-09 PROCEDURE — 96376 TX/PRO/DX INJ SAME DRUG ADON: CPT

## 2019-07-09 PROCEDURE — 93005 ELECTROCARDIOGRAM TRACING: CPT | Performed by: INTERNAL MEDICINE

## 2019-07-09 PROCEDURE — 2580000003 HC RX 258: Performed by: NURSE PRACTITIONER

## 2019-07-09 PROCEDURE — 2580000003 HC RX 258: Performed by: INTERNAL MEDICINE

## 2019-07-09 PROCEDURE — 96375 TX/PRO/DX INJ NEW DRUG ADDON: CPT

## 2019-07-09 PROCEDURE — 2580000003 HC RX 258: Performed by: SURGERY

## 2019-07-09 PROCEDURE — 6370000000 HC RX 637 (ALT 250 FOR IP): Performed by: NURSE PRACTITIONER

## 2019-07-09 PROCEDURE — 85025 COMPLETE CBC W/AUTO DIFF WBC: CPT

## 2019-07-09 RX ORDER — LANOLIN ALCOHOL/MO/W.PET/CERES
3 CREAM (GRAM) TOPICAL NIGHTLY PRN
Status: DISCONTINUED | OUTPATIENT
Start: 2019-07-09 | End: 2019-07-10 | Stop reason: HOSPADM

## 2019-07-09 RX ORDER — LISINOPRIL 5 MG/1
5 TABLET ORAL DAILY
Status: DISCONTINUED | OUTPATIENT
Start: 2019-07-09 | End: 2019-07-10 | Stop reason: HOSPADM

## 2019-07-09 RX ORDER — DILTIAZEM HYDROCHLORIDE 5 MG/ML
10 INJECTION INTRAVENOUS ONCE
Status: COMPLETED | OUTPATIENT
Start: 2019-07-09 | End: 2019-07-09

## 2019-07-09 RX ORDER — LISINOPRIL 5 MG/1
5 TABLET ORAL DAILY
Qty: 30 TABLET | Refills: 2 | Status: SHIPPED | OUTPATIENT
Start: 2019-07-09 | End: 2019-07-10 | Stop reason: HOSPADM

## 2019-07-09 RX ORDER — PANTOPRAZOLE SODIUM 40 MG/1
40 TABLET, DELAYED RELEASE ORAL
Qty: 60 TABLET | Refills: 1 | Status: SHIPPED | OUTPATIENT
Start: 2019-07-09 | End: 2019-09-17 | Stop reason: SDUPTHER

## 2019-07-09 RX ORDER — ZOLPIDEM TARTRATE 5 MG/1
5 TABLET ORAL NIGHTLY PRN
Status: DISCONTINUED | OUTPATIENT
Start: 2019-07-09 | End: 2019-07-10 | Stop reason: HOSPADM

## 2019-07-09 RX ADMIN — CIPROFLOXACIN 400 MG: 2 INJECTION, SOLUTION INTRAVENOUS at 00:36

## 2019-07-09 RX ADMIN — LISINOPRIL 5 MG: 5 TABLET ORAL at 16:47

## 2019-07-09 RX ADMIN — SODIUM CHLORIDE: 9 INJECTION, SOLUTION INTRAVENOUS at 07:50

## 2019-07-09 RX ADMIN — DILTIAZEM HYDROCHLORIDE 10 MG: 5 INJECTION INTRAVENOUS at 18:43

## 2019-07-09 RX ADMIN — Medication 10 ML: at 00:43

## 2019-07-09 RX ADMIN — ATORVASTATIN CALCIUM 10 MG: 10 TABLET, FILM COATED ORAL at 07:50

## 2019-07-09 RX ADMIN — Medication 10 ML: at 22:54

## 2019-07-09 RX ADMIN — PANTOPRAZOLE SODIUM 40 MG: 40 INJECTION, POWDER, FOR SOLUTION INTRAVENOUS at 07:50

## 2019-07-09 RX ADMIN — DILTIAZEM HYDROCHLORIDE 10 MG/HR: 5 INJECTION INTRAVENOUS at 20:24

## 2019-07-09 RX ADMIN — PANTOPRAZOLE SODIUM 40 MG: 40 INJECTION, POWDER, FOR SOLUTION INTRAVENOUS at 22:50

## 2019-07-09 RX ADMIN — CLONIDINE HYDROCHLORIDE 0.1 MG: 0.1 TABLET ORAL at 07:50

## 2019-07-09 RX ADMIN — CIPROFLOXACIN 400 MG: 2 INJECTION, SOLUTION INTRAVENOUS at 07:50

## 2019-07-09 RX ADMIN — LEVOTHYROXINE SODIUM 50 MCG: 25 TABLET ORAL at 07:50

## 2019-07-09 RX ADMIN — ACETAMINOPHEN 650 MG: 325 TABLET, FILM COATED ORAL at 00:43

## 2019-07-09 RX ADMIN — DILTIAZEM HYDROCHLORIDE 5 MG/HR: 5 INJECTION INTRAVENOUS at 23:33

## 2019-07-09 RX ADMIN — DILTIAZEM HYDROCHLORIDE 5 MG/HR: 5 INJECTION INTRAVENOUS at 18:42

## 2019-07-09 RX ADMIN — ROPINIROLE HYDROCHLORIDE 0.5 MG: 0.25 TABLET, FILM COATED ORAL at 22:50

## 2019-07-09 RX ADMIN — CLONIDINE HYDROCHLORIDE 0.1 MG: 0.1 TABLET ORAL at 22:49

## 2019-07-09 ASSESSMENT — PAIN SCALES - GENERAL
PAINLEVEL_OUTOF10: 5
PAINLEVEL_OUTOF10: 5
PAINLEVEL_OUTOF10: 0

## 2019-07-09 ASSESSMENT — PAIN DESCRIPTION - ORIENTATION: ORIENTATION: POSTERIOR

## 2019-07-09 ASSESSMENT — PAIN DESCRIPTION - LOCATION: LOCATION: HEAD

## 2019-07-09 ASSESSMENT — PAIN DESCRIPTION - PAIN TYPE: TYPE: ACUTE PAIN

## 2019-07-09 NOTE — PLAN OF CARE
Problem: Falls - Risk of:  Goal: Will remain free from falls  Description  Will remain free from falls  7/9/2019 0108 by Kitty Torres RN  Outcome: Ongoing  7/8/2019 1120 by Earl Trinh RN  Outcome: Ongoing  Goal: Absence of physical injury  Description  Absence of physical injury  7/9/2019 0108 by Kitty Torres RN  Outcome: Ongoing  7/8/2019 1120 by Earl Trinh RN  Outcome: Ongoing     Problem:  Bowel/Gastric:  Goal: Occurrences of diarrhea will decrease  Description  Occurrences of diarrhea will decrease  7/9/2019 0108 by Kitty Torres RN  Outcome: Ongoing  7/8/2019 1120 by Earl Trinh RN  Outcome: Ongoing  Goal: Control of bowel function will improve  Description  Control of bowel function will improve  7/9/2019 0108 by Kitty Torres RN  Outcome: Ongoing  7/8/2019 1120 by Earl Trinh RN  Outcome: Ongoing     Problem: Pain:  Goal: Pain level will decrease  Description  Pain level will decrease  Outcome: Ongoing  Goal: Control of acute pain  Description  Control of acute pain  Outcome: Ongoing  Goal: Control of chronic pain  Description  Control of chronic pain  Outcome: Ongoing

## 2019-07-09 NOTE — PROGRESS NOTES
Pt awake in bed. Pt c/o trouble sleeping, would like to watch TV. Lima Delgado Pt states headache has improved. Will continue to monitor.

## 2019-07-09 NOTE — PROGRESS NOTES
[facility]  Gastroenterology Progress Note    Ramakrishna Allen is a 74 y.o. female patient. 1. Gastrointestinal hemorrhage associated with intestinal diverticulitis        SUBJECTIVE:  Had 2 BMs today which were black with some maroon blood. ROS:  Cardiovascular ROS: no chest pain or dyspnea on exertion  Gastrointestinal ROS: no abdominal pain, change in bowel habits, or black or bloody stools  Respiratory ROS: no cough, shortness of breath, or wheezing    Physical    VITALS:  BP (!) 159/72   Pulse 78   Temp 98 °F (36.7 °C) (Oral)   Resp 16   Ht 5' 1\" (1.549 m)   Wt 146 lb 11.2 oz (66.5 kg)   SpO2 95%   BMI 27.72 kg/m²   TEMPERATURE:  Current - Temp: 98 °F (36.7 °C); Max - Temp  Av °F (36.7 °C)  Min: 97.7 °F (36.5 °C)  Max: 98.4 °F (36.9 °C)    NAD  RRR, Nl s1s2  Lungs CTA Bilaterally, normal effort  Abdomen soft, ND, NT, no HSM, Bowel sounds normal  AAOx3, No asterixis     Data    Data Review:    Recent Labs     19  03119  0543 19  1435 19  1846 19  0557   WBC 7.9  --  8.5  --   --  7.1   HGB 11.4*   < > 10.3* 10.3* 10.0* 9.2*   HCT 34.2*   < > 31.8* 32.2* 30.4* 27.8*   MCV 81.1  --  82.9  --   --  81.5     --  276  --   --  256    < > = values in this interval not displayed. Recent Labs     19  03119  0544 19  0557   * 136 135*   K 4.1 4.2 3.6   CL 96* 103 105   CO2 23 19* 18*   PHOS  --   --  3.2   BUN 20 10 5*   CREATININE 0.9 0.9 0.8     Recent Labs     19  0544   AST 29 26   ALT 11 13   BILITOT 0.4 0.3   ALKPHOS 75 67     No results for input(s): LIPASE, AMYLASE in the last 72 hours. Recent Labs     19  0315   PROTIME 15.0*   INR 1.32*     No results for input(s): PTT in the last 72 hours. ASSESSMENT     Melena, gastric and duodenal ulcers - s/p EGD  with nonbleeding antral erosions, pyloric channel ulcer and duodenal bulb ulcer/stenosis. Passed black stool with some maroon blood today.  Suspect old blood but since hgb came down 1 gram would watch inpatient overnight. Recent diverticulitis  Paroxysmal afib       PLAN    - f/u path  - monitor hgb  - Oral pantoprazole 40 mg BID X 8 weeks and then daily thereafter   - would be ok from GI standpoint for anticoagulation in 48 hours if no recurrent bleeding. Per EP note, appears pt wishes to defer anticoagulation at this time and monitor for recurrence of afib. - EGD in 8 weeks to ensure healing. Also plan colonoscopy at that time for f/u diverticulitis.    - would monitor inpatient until tomorrow    [facility] Paola Hunt[facility] [address] [facility]

## 2019-07-09 NOTE — PROGRESS NOTES
 Osteoporosis     Vitiligo         Past Surgical History:    has a past surgical history that includes Tubal ligation; Colonoscopy (2008); Colonoscopy (4/10/2014); bronchoscopy (N/A, 12/7/2018); and Upper gastrointestinal endoscopy (N/A, 7/8/2019). Social History:  Reviewed. reports that she has never smoked. She has never used smokeless tobacco. She reports that she drinks about 1.8 oz of alcohol per week. She reports that she does not use drugs. Family History:  Reviewed. family history includes Arthritis in her father and mother; Cancer in her father; Diabetes in her mother; Heart Disease in her mother; Kidney Disease in her mother. Denies family history of sudden cardiac death, arrhythmia, premature CAD    Review of System:  · Constitutional: Negative for fever, night sweats, chills, weight changes, or weakness  · Skin: Negative for rash, dry skin, pruritus, bruising, bleeding, blood clots, or changes in skin pigment  · HEENT: Negative for vision changes, ringing in the ears, sore throat, dysphagia, or swollen lymph nodes  · Respiratory: Reviewed in HPI  · Cardiovascular: Reviewed in HPI  · Gastrointestinal: Positive for mild abd pain and black/tarry. Negative for N/V/D, constipation. · Genito-Urinary: Negative for dysuria, incontinence, urgency, or hematuria  · Musculoskeletal: Negative for joint swelling, muscle pain, or injuries  · Neurological/Psych: Negative for confusion, seizures, headaches, balance issues or TIA-like symptoms.  No anxiety, depression, or insomnia    Physical Examination:  Vitals:    07/09/19 0755   BP: (!) 159/72   Pulse: 78   Resp: 16   Temp: 98 °F (36.7 °C)   SpO2: 95%      In: 300 [P.O.:300]  Out: 1525    Wt Readings from Last 3 Encounters:   07/09/19 146 lb 11.2 oz (66.5 kg)   02/19/19 152 lb (68.9 kg)   01/31/19 155 lb 8 oz (70.5 kg)       Telemetry: Personally Reviewed  - Sinus rhythm   · Constitutional: Cooperative and in no apparent distress, and appears well

## 2019-07-09 NOTE — PROGRESS NOTES
CLINICAL PHARMACY NOTE: MEDS TO [address] Select Patient?: No  Total # of Prescriptions Filled: 1   The following medications were delivered to the patient:  · Pantoprazole  Total # of Interventions Completed: 0  Time Spent (min): 90    Additional Documentation:  Delivered to patient  [place]

## 2019-07-10 VITALS
DIASTOLIC BLOOD PRESSURE: 61 MMHG | BODY MASS INDEX: 28.37 KG/M2 | HEIGHT: 61 IN | OXYGEN SATURATION: 99 % | SYSTOLIC BLOOD PRESSURE: 105 MMHG | TEMPERATURE: 97.8 F | WEIGHT: 150.3 LBS | HEART RATE: 64 BPM | RESPIRATION RATE: 16 BRPM

## 2019-07-10 LAB
ALBUMIN SERPL-MCNC: 3.2 G/DL (ref 3.4–5)
ANION GAP SERPL CALCULATED.3IONS-SCNC: 9 MMOL/L (ref 3–16)
BASOPHILS ABSOLUTE: 0.1 K/UL (ref 0–0.2)
BASOPHILS RELATIVE PERCENT: 2.2 %
BUN BLDV-MCNC: 7 MG/DL (ref 7–20)
CALCIUM SERPL-MCNC: 8.2 MG/DL (ref 8.3–10.6)
CHLORIDE BLD-SCNC: 106 MMOL/L (ref 99–110)
CO2: 22 MMOL/L (ref 21–32)
CREAT SERPL-MCNC: 0.8 MG/DL (ref 0.6–1.2)
EKG ATRIAL RATE: 220 BPM
EKG DIAGNOSIS: NORMAL
EKG Q-T INTERVAL: 308 MS
EKG QRS DURATION: 84 MS
EKG QTC CALCULATION (BAZETT): 482 MS
EKG R AXIS: 14 DEGREES
EKG T AXIS: -8 DEGREES
EKG VENTRICULAR RATE: 147 BPM
EOSINOPHILS ABSOLUTE: 0.4 K/UL (ref 0–0.6)
EOSINOPHILS RELATIVE PERCENT: 6.8 %
GFR AFRICAN AMERICAN: >60
GFR NON-AFRICAN AMERICAN: >60
GLUCOSE BLD-MCNC: 124 MG/DL (ref 70–99)
HCT VFR BLD CALC: 25.9 % (ref 36–48)
HEMOGLOBIN: 8.6 G/DL (ref 12–16)
LYMPHOCYTES ABSOLUTE: 1.4 K/UL (ref 1–5.1)
LYMPHOCYTES RELATIVE PERCENT: 22.7 %
MCH RBC QN AUTO: 26.9 PG (ref 26–34)
MCHC RBC AUTO-ENTMCNC: 33.4 G/DL (ref 31–36)
MCV RBC AUTO: 80.6 FL (ref 80–100)
MONOCYTES ABSOLUTE: 0.7 K/UL (ref 0–1.3)
MONOCYTES RELATIVE PERCENT: 10.9 %
NEUTROPHILS ABSOLUTE: 3.5 K/UL (ref 1.7–7.7)
NEUTROPHILS RELATIVE PERCENT: 57.4 %
PDW BLD-RTO: 14.4 % (ref 12.4–15.4)
PHOSPHORUS: 3.2 MG/DL (ref 2.5–4.9)
PLATELET # BLD: 237 K/UL (ref 135–450)
PMV BLD AUTO: 7.8 FL (ref 5–10.5)
POTASSIUM SERPL-SCNC: 3.8 MMOL/L (ref 3.5–5.1)
RBC # BLD: 3.21 M/UL (ref 4–5.2)
SODIUM BLD-SCNC: 137 MMOL/L (ref 136–145)
WBC # BLD: 6 K/UL (ref 4–11)

## 2019-07-10 PROCEDURE — 6370000000 HC RX 637 (ALT 250 FOR IP): Performed by: INTERNAL MEDICINE

## 2019-07-10 PROCEDURE — G0378 HOSPITAL OBSERVATION PER HR: HCPCS

## 2019-07-10 PROCEDURE — 36415 COLL VENOUS BLD VENIPUNCTURE: CPT

## 2019-07-10 PROCEDURE — 93010 ELECTROCARDIOGRAM REPORT: CPT | Performed by: INTERNAL MEDICINE

## 2019-07-10 PROCEDURE — 6370000000 HC RX 637 (ALT 250 FOR IP): Performed by: NURSE PRACTITIONER

## 2019-07-10 PROCEDURE — 85025 COMPLETE CBC W/AUTO DIFF WBC: CPT

## 2019-07-10 PROCEDURE — 2580000003 HC RX 258: Performed by: NURSE PRACTITIONER

## 2019-07-10 PROCEDURE — 6370000000 HC RX 637 (ALT 250 FOR IP): Performed by: PHYSICIAN ASSISTANT

## 2019-07-10 PROCEDURE — 2500000003 HC RX 250 WO HCPCS: Performed by: INTERNAL MEDICINE

## 2019-07-10 PROCEDURE — 99213 OFFICE O/P EST LOW 20 MIN: CPT | Performed by: INTERNAL MEDICINE

## 2019-07-10 PROCEDURE — 2580000003 HC RX 258

## 2019-07-10 PROCEDURE — 80069 RENAL FUNCTION PANEL: CPT

## 2019-07-10 PROCEDURE — 6360000002 HC RX W HCPCS: Performed by: INTERNAL MEDICINE

## 2019-07-10 PROCEDURE — 2580000003 HC RX 258: Performed by: INTERNAL MEDICINE

## 2019-07-10 PROCEDURE — 96366 THER/PROPH/DIAG IV INF ADDON: CPT

## 2019-07-10 RX ORDER — CIPROFLOXACIN 500 MG/1
500 TABLET, FILM COATED ORAL EVERY 12 HOURS SCHEDULED
Qty: 20 TABLET | Refills: 0
Start: 2019-07-10 | End: 2019-07-20

## 2019-07-10 RX ORDER — METRONIDAZOLE 500 MG/1
500 TABLET ORAL 3 TIMES DAILY
Qty: 30 TABLET | Refills: 0
Start: 2019-07-10 | End: 2019-07-20

## 2019-07-10 RX ORDER — FERROUS SULFATE 325(65) MG
325 TABLET ORAL 2 TIMES DAILY WITH MEALS
Status: DISCONTINUED | OUTPATIENT
Start: 2019-07-10 | End: 2019-07-10 | Stop reason: HOSPADM

## 2019-07-10 RX ORDER — SUCRALFATE 1 G/1
1 TABLET ORAL 4 TIMES DAILY
Qty: 120 TABLET | Refills: 3 | Status: SHIPPED | OUTPATIENT
Start: 2019-07-10 | End: 2019-08-13 | Stop reason: ALTCHOICE

## 2019-07-10 RX ORDER — 0.9 % SODIUM CHLORIDE 0.9 %
500 INTRAVENOUS SOLUTION INTRAVENOUS ONCE
Status: COMPLETED | OUTPATIENT
Start: 2019-07-10 | End: 2019-07-10

## 2019-07-10 RX ORDER — SODIUM CHLORIDE 9 MG/ML
INJECTION, SOLUTION INTRAVENOUS
Status: COMPLETED
Start: 2019-07-10 | End: 2019-07-10

## 2019-07-10 RX ORDER — PANTOPRAZOLE SODIUM 40 MG/1
40 TABLET, DELAYED RELEASE ORAL
Status: DISCONTINUED | OUTPATIENT
Start: 2019-07-10 | End: 2019-07-10 | Stop reason: HOSPADM

## 2019-07-10 RX ORDER — ZOLPIDEM TARTRATE 5 MG/1
5 TABLET ORAL NIGHTLY PRN
Qty: 5 TABLET | Refills: 0 | Status: SHIPPED | OUTPATIENT
Start: 2019-07-10 | End: 2019-07-15

## 2019-07-10 RX ORDER — FERROUS SULFATE 325(65) MG
325 TABLET ORAL 2 TIMES DAILY WITH MEALS
Qty: 30 TABLET | Refills: 3
Start: 2019-07-10 | End: 2019-08-13 | Stop reason: ALTCHOICE

## 2019-07-10 RX ORDER — CIPROFLOXACIN 500 MG/1
500 TABLET, FILM COATED ORAL EVERY 12 HOURS SCHEDULED
Status: DISCONTINUED | OUTPATIENT
Start: 2019-07-10 | End: 2019-07-10 | Stop reason: HOSPADM

## 2019-07-10 RX ORDER — LISINOPRIL 5 MG/1
5 TABLET ORAL DAILY
Qty: 30 TABLET | Refills: 2 | Status: SHIPPED | OUTPATIENT
Start: 2019-07-10 | End: 2019-08-07 | Stop reason: SDUPTHER

## 2019-07-10 RX ORDER — DILTIAZEM HYDROCHLORIDE 120 MG/1
120 CAPSULE, COATED, EXTENDED RELEASE ORAL DAILY
Qty: 30 CAPSULE | Refills: 2 | Status: SHIPPED | OUTPATIENT
Start: 2019-07-10 | End: 2019-08-06 | Stop reason: SDUPTHER

## 2019-07-10 RX ORDER — DILTIAZEM HYDROCHLORIDE 120 MG/1
120 CAPSULE, COATED, EXTENDED RELEASE ORAL DAILY
Qty: 30 CAPSULE | Refills: 2 | Status: SHIPPED | OUTPATIENT
Start: 2019-07-10 | End: 2019-07-10

## 2019-07-10 RX ORDER — LISINOPRIL 5 MG/1
5 TABLET ORAL DAILY
Qty: 30 TABLET | Refills: 2 | Status: SHIPPED | OUTPATIENT
Start: 2019-07-10 | End: 2019-07-10

## 2019-07-10 RX ORDER — METRONIDAZOLE 250 MG/1
500 TABLET ORAL EVERY 8 HOURS SCHEDULED
Status: DISCONTINUED | OUTPATIENT
Start: 2019-07-10 | End: 2019-07-10 | Stop reason: HOSPADM

## 2019-07-10 RX ADMIN — CIPROFLOXACIN HYDROCHLORIDE 500 MG: 500 TABLET, FILM COATED ORAL at 17:01

## 2019-07-10 RX ADMIN — CIPROFLOXACIN 400 MG: 2 INJECTION, SOLUTION INTRAVENOUS at 04:20

## 2019-07-10 RX ADMIN — LISINOPRIL 5 MG: 5 TABLET ORAL at 09:46

## 2019-07-10 RX ADMIN — ATORVASTATIN CALCIUM 10 MG: 10 TABLET, FILM COATED ORAL at 09:46

## 2019-07-10 RX ADMIN — CLONIDINE HYDROCHLORIDE 0.1 MG: 0.1 TABLET ORAL at 09:46

## 2019-07-10 RX ADMIN — ZOLPIDEM TARTRATE 5 MG: 5 TABLET ORAL at 00:06

## 2019-07-10 RX ADMIN — SODIUM CHLORIDE 500 ML: 9 INJECTION, SOLUTION INTRAVENOUS at 02:11

## 2019-07-10 RX ADMIN — PANTOPRAZOLE SODIUM 40 MG: 40 TABLET, DELAYED RELEASE ORAL at 17:01

## 2019-07-10 RX ADMIN — METRONIDAZOLE 500 MG: 500 INJECTION, SOLUTION INTRAVENOUS at 06:13

## 2019-07-10 RX ADMIN — Medication 10 ML: at 09:47

## 2019-07-10 RX ADMIN — LEVOTHYROXINE SODIUM 50 MCG: 25 TABLET ORAL at 09:46

## 2019-07-10 RX ADMIN — METRONIDAZOLE 500 MG: 250 TABLET, FILM COATED ORAL at 15:15

## 2019-07-10 RX ADMIN — FERROUS SULFATE TAB 325 MG (65 MG ELEMENTAL FE) 325 MG: 325 (65 FE) TAB at 17:02

## 2019-07-10 RX ADMIN — PANTOPRAZOLE SODIUM 40 MG: 40 TABLET, DELAYED RELEASE ORAL at 09:50

## 2019-07-10 RX ADMIN — SODIUM CHLORIDE: 9 INJECTION, SOLUTION INTRAVENOUS at 04:40

## 2019-07-10 ASSESSMENT — PAIN SCALES - GENERAL
PAINLEVEL_OUTOF10: 0
PAINLEVEL_OUTOF10: 0

## 2019-07-10 NOTE — PROGRESS NOTES
Data- discharge order received, pt verbalized agreement to discharge, disposition to previous residence, no needs for HHC/DME. Action- discharge instructions prepared and given to pt, pt verbalized understanding. Medication information packet given r/t NEW and/or CHANGED prescriptions emphasizing name/purpose/side effects, pt verbalized understanding. Discharge instruction summary: Diet- low fiber, gluten free, Activity- as tolerated, Primary Care Physician as follows: Sacha Allred  f/u appointment to be scheduled by pt , immunizations reviewed, prescription medications filled by pt. Response- Pt belongings gathered, IV removed. Disposition is home (no HHC/DME needs), transported with , taken to lobby via w/c, no complications.

## 2019-07-10 NOTE — PROGRESS NOTES
Was notified ok to give Clonidine and monitor. Also to let Hopsitalist know if HR gets into 130-140's sustained.

## 2019-07-10 NOTE — PROGRESS NOTES
500 mg Oral 3 times per day    pantoprazole  40 mg Oral BID AC    ferrous sulfate  325 mg Oral BID WC    lisinopril  5 mg Oral Daily    atorvastatin  10 mg Oral Daily    cloNIDine  0.1 mg Oral BID    levothyroxine  50 mcg Oral Daily    rOPINIRole  0.5 mg Oral Nightly    sodium chloride flush  10 mL Intravenous 2 times per day     Continuous Infusions:  PRN Meds:melatonin, zolpidem, sodium chloride flush, magnesium hydroxide, ondansetron, potassium chloride **OR** potassium alternative oral replacement **OR** potassium chloride, magnesium sulfate, acetaminophen     Patient Active Problem List    Diagnosis Date Noted    Gastrointestinal hemorrhage associated with intestinal diverticulitis     PAF (paroxysmal atrial fibrillation) ([facility]     Diverticulitis 07/07/2019    Mild persistent asthma without complication 19/91/0822    Abnormal CT of the chest     Bronchiectasis without complication ([facility] 36/61/3917    Pulmonary nodules 11/29/2018    Gastroesophageal reflux disease 11/29/2018    Hypothyroid 06/06/2017    Gastroesophageal reflux disease without esophagitis 06/06/2017    Iron deficiency anemia 05/24/2016    MGUS (monoclonal gammopathy of unknown significance) 05/04/2016    Acute renal failure (ARF) ([facility] 05/04/2016    Intestinal malabsorption 11/19/2014    Anemia 10/29/2014    Iron deficiency 10/29/2014    Essential hypertension, benign 04/30/2014    Mixed hyperlipidemia 04/30/2014      Active Hospital Problems    Diagnosis Date Noted    Gastrointestinal hemorrhage associated with intestinal diverticulitis [K57.93]     PAF (paroxysmal atrial fibrillation) ([facility] [I48.0]     Diverticulitis [K57.92] 07/07/2019    Gastroesophageal reflux disease [K21.9] 11/29/2018    Hypothyroid [E03.9] 06/06/2017    Gastroesophageal reflux disease without esophagitis [K21.9] 06/06/2017    MGUS (monoclonal gammopathy of unknown significance) [D47.2] 05/04/2016    Intestinal malabsorption [K90.9]

## 2019-08-06 ENCOUNTER — OFFICE VISIT (OUTPATIENT)
Dept: CARDIOLOGY CLINIC | Age: 74
End: 2019-08-06
Payer: MEDICARE

## 2019-08-06 VITALS
DIASTOLIC BLOOD PRESSURE: 70 MMHG | SYSTOLIC BLOOD PRESSURE: 140 MMHG | WEIGHT: 140.8 LBS | OXYGEN SATURATION: 98 % | BODY MASS INDEX: 26.58 KG/M2 | HEART RATE: 64 BPM | HEIGHT: 61 IN

## 2019-08-06 DIAGNOSIS — I48.0 PAF (PAROXYSMAL ATRIAL FIBRILLATION) (HCC): Primary | ICD-10-CM

## 2019-08-06 DIAGNOSIS — K57.93 GASTROINTESTINAL HEMORRHAGE ASSOCIATED WITH INTESTINAL DIVERTICULITIS: ICD-10-CM

## 2019-08-06 DIAGNOSIS — D50.8 OTHER IRON DEFICIENCY ANEMIA: ICD-10-CM

## 2019-08-06 DIAGNOSIS — I10 ESSENTIAL HYPERTENSION, BENIGN: ICD-10-CM

## 2019-08-06 PROCEDURE — 93000 ELECTROCARDIOGRAM COMPLETE: CPT | Performed by: NURSE PRACTITIONER

## 2019-08-06 PROCEDURE — 99214 OFFICE O/P EST MOD 30 MIN: CPT | Performed by: NURSE PRACTITIONER

## 2019-08-06 RX ORDER — DILTIAZEM HYDROCHLORIDE 120 MG/1
120 CAPSULE, COATED, EXTENDED RELEASE ORAL DAILY
Qty: 30 CAPSULE | Refills: 2 | Status: SHIPPED | OUTPATIENT
Start: 2019-08-06 | End: 2019-08-07 | Stop reason: SDUPTHER

## 2019-08-06 NOTE — PROGRESS NOTES
[facility]    Cardiac Follow Up    Zoe Navarrete is 74 y.o. female who presents today for a routine follow up. Karla Melgoza has a history of hypertension and hyperlipidemia. In May she developed acute renal failure from medications, hyzaar, this was stopped and Evaluated by DR. Brandi Gregg. She continues to FU with him and labs have returned to normal  She has been bothered by a cough, has seen pulmonary and determined she has asthma and reflex. Had recent echo and CT scan    She denies SOB/ROMERO, PND, palpitations, light-headedness, or edema. She is retired now but they work part time at a [employer] course. . She did go to ER in June 2017  with chest pain, had GXT which was neg. Believed pain due to GERD, she has not had any recurrence   7/19     pt states she had an outpatient CT and was told she has diverticulitis, on abx. states this AM around 0100 she began with black diarrhea, pt very anxious and tearful in triage     Had EGD twhich showed non-bleeding pyloric and duodenal ulcerations. Biopsies were sent and recommended repeat EGD in 8 weeks. Which is scheduled for 9/3  During this admission, she briefly flipped into afib RVR,  (she could not tell that she had at fib when occurred)which resolved with x1 dose of IV cardizem. No prior hx of AF/arrhythmias. She has a monitor on with one week left.   She is feeling better but still weak       Past Medical History:    Past Medical History:   Diagnosis Date    Celiac sprue     GERD (gastroesophageal reflux disease)     Hyperlipidemia     Hypertension     Osteoarthritis     Osteoporosis     Vitiligo      Social History:    Social History     Tobacco Use   Smoking Status Never Smoker   Smokeless Tobacco Never Used     Current Medications:  Current Outpatient Medications on File Prior to Visit   Medication Sig Dispense Refill    Prenatal MV-Min-Fe Fum-FA-DHA (PRENATAL 1 PO) Take by mouth      lisinopril (PRINIVIL;ZESTRIL) 5 MG tablet Take 1 tablet by

## 2019-08-06 NOTE — LETTER
[address] [place] [address] [address]  Phone: [phone]  Fax: [phone]    Char Paula, APRN - CNP        August 6, 2019     [address] [place] [id number]    Patient: Ilean Kehr  MR Number: [id number]  YOB: 1945  Date of Visit: 8/6/2019    Dear Dr. Karla Ramos:      Lefty Molina is 74 y.o. female who presents today for a routine follow up. Margie Higgins has a history of hypertension and hyperlipidemia. In May she developed acute renal failure from medications, hyzaar, this was stopped and Evaluated by DR. Shy Stapleton. She continues to FU with him and labs have returned to normal  She has been bothered by a cough, has seen pulmonary and determined she has asthma and reflex. Had recent echo and CT scan    She denies SOB/ROMERO, PND, palpitations, light-headedness, or edema. 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 . She did go to ER in June 2017  with chest pain, had GXT which was neg. Believed pain due to GERD, she has not had any recurrence   7/19     pt states she had an outpatient CT and was told she has diverticulitis, on abx. states this AM around 0100 she began with black diarrhea, pt very anxious and tearful in triage     Had EGD twhich showed non-bleeding pyloric and duodenal ulcerations. Biopsies were sent and recommended repeat EGD in 8 weeks. Which is scheduled for 9/3  During this admission, she briefly flipped into afib RVR,  (she could not tell that she had at fib when occurred)which resolved with x1 dose of IV cardizem. No prior hx of AF/arrhythmias. She has a monitor on with one week left.   She is feeling better but still weak       Past Medical History:    Past Medical History:   Diagnosis Date    Celiac sprue     GERD (gastroesophageal reflux disease)     Hyperlipidemia     Hypertension     Osteoarthritis RESPIRATORY: No new SOB, PND, orthopnea or cough. CARDIOVASCULAR: See HPI  GI: No nausea, vomiting, diarrhea, constipation, abdominal pain or changes in bowel habits. : No urinary frequency, urgency, incontinence hematuria or dysuria. SKIN: No cyanosis or skin lesions. MUSCULOSKELETAL: No new muscle or joint pain. NEUROLOGICAL: No syncope or TIA-like symptoms. PSYCHIATRIC: No anxiety, pain, insomnia or depression    Objective:   PHYSICAL EXAM:        VITALS:    BP (!) 140/70   Pulse 64   Ht 5' 1\" (1.549 m)   Wt 140 lb 12.8 oz (63.9 kg)   SpO2 98%   BMI 26.60 kg/m²    CONSTITUTIONAL: Cooperative, no apparent distress, and appears well nourished / developed  NEUROLOGIC:  Awake and orientated to person, place and time. PSYCH: Calm affect. SKIN: Warm and dry. HEENT: Sclera non-icteric, normocephalic, neck supple, no elevation of JVP, normal carotid pulses with no bruits and thyroid normal size. LUNGS:  No increased work of breathing and clear to auscultation, no crackles or wheezing  CARDIOVASCULAR:  Regular rate and rhythm with no murmurs, gallops, rubs, or abnormal heart sounds, normal PMI. The apical impulses not displaced  JVP less than 8 cm H2O  Heart tones are crisp and normal  Cervical veins are not engorged  The carotid upstroke is normal in amplitude and contour without delay or bruit  JVP is not elevated  ABDOMEN:  Normal bowel sounds, non-distended and non-tender to palpation  EXT: No edema, no calf tenderness. Pulses are present bilaterally.     DATA:    Lab Results   Component Value Date    ALT 13 07/08/2019    AST 26 07/08/2019    ALKPHOS 67 07/08/2019    BILITOT 0.3 07/08/2019     Lab Results   Component Value Date    CREATININE 0.8 07/10/2019    BUN 7 07/10/2019     07/10/2019    K 3.8 07/10/2019     07/10/2019    CO2 22 07/10/2019     Lab Results   Component Value Date    TSH 2.09 12/08/2015     Lab Results   Component Value Date    WBC 6.0 07/10/2019 If you have questions, please do not hesitate to call me. I look forward to following Becka Francisco along with you.     Sincerely,        Nilsa Curiel, MEHRDAD - CNP

## 2019-08-07 ENCOUNTER — TELEPHONE (OUTPATIENT)
Dept: CARDIOLOGY CLINIC | Age: 74
End: 2019-08-07

## 2019-08-07 RX ORDER — LISINOPRIL 5 MG/1
5 TABLET ORAL DAILY
Qty: 90 TABLET | Refills: 3 | Status: SHIPPED | OUTPATIENT
Start: 2019-08-07 | End: 2019-08-09

## 2019-08-07 RX ORDER — DILTIAZEM HYDROCHLORIDE 120 MG/1
120 CAPSULE, COATED, EXTENDED RELEASE ORAL DAILY
Qty: 90 CAPSULE | Refills: 3 | Status: SHIPPED | OUTPATIENT
Start: 2019-08-07 | End: 2020-03-04 | Stop reason: SDUPTHER

## 2019-08-07 NOTE — TELEPHONE ENCOUNTER
Talked to Dr. Ottoniel Mckinney, usually nephritis is due to HCTZ no ACE inhibitor, she was started on ACE in hospital and will get blood work next week, if renal panel is normal will try to increase ACE with blood work and wean off clonidine. Justyna talked to DR. Shell Beaulieu, will leave her on cardizem, and implant loop recorder to track any at fib, she could  not tell when she was in at fib in the hospital  MCOT so far did not reveal any at fib

## 2019-08-08 ENCOUNTER — HOSPITAL ENCOUNTER (OUTPATIENT)
Age: 74
Discharge: HOME | End: 2019-08-08
Payer: MEDICARE

## 2019-08-08 DIAGNOSIS — I48.0 PAF (PAROXYSMAL ATRIAL FIBRILLATION) (HCC): ICD-10-CM

## 2019-08-08 LAB
ANION GAP SERPL CALCULATED.3IONS-SCNC: 17 MMOL/L (ref 3–16)
BUN BLDV-MCNC: 24 MG/DL (ref 7–20)
CALCIUM SERPL-MCNC: 9.8 MG/DL (ref 8.3–10.6)
CHLORIDE BLD-SCNC: 97 MMOL/L (ref 99–110)
CO2: 20 MMOL/L (ref 21–32)
CREAT SERPL-MCNC: 0.9 MG/DL (ref 0.6–1.2)
GFR AFRICAN AMERICAN: >60
GFR NON-AFRICAN AMERICAN: >60
GLUCOSE BLD-MCNC: 104 MG/DL (ref 70–99)
POTASSIUM SERPL-SCNC: 4.7 MMOL/L (ref 3.5–5.1)
SODIUM BLD-SCNC: 134 MMOL/L (ref 136–145)

## 2019-08-08 PROCEDURE — 36415 COLL VENOUS BLD VENIPUNCTURE: CPT

## 2019-08-08 PROCEDURE — 80048 BASIC METABOLIC PNL TOTAL CA: CPT

## 2019-08-09 ENCOUNTER — TELEPHONE (OUTPATIENT)
Dept: CARDIOLOGY CLINIC | Age: 74
End: 2019-08-09

## 2019-08-09 RX ORDER — LISINOPRIL 5 MG/1
10 TABLET ORAL DAILY
Qty: 90 TABLET | Refills: 3
Start: 2019-08-09 | End: 2019-08-27

## 2019-08-13 ENCOUNTER — ANESTHESIA EVENT (OUTPATIENT)
Dept: ENDOSCOPY | Age: 74
End: 2019-08-13
Payer: MEDICARE

## 2019-08-13 RX ORDER — FERROUS SULFATE 325(65) MG
325 TABLET ORAL 2 TIMES DAILY
COMMUNITY
End: 2020-02-14 | Stop reason: ALTCHOICE

## 2019-08-14 PROCEDURE — 93228 REMOTE 30 DAY ECG REV/REPORT: CPT | Performed by: INTERNAL MEDICINE

## 2019-08-14 RX ORDER — PANTOPRAZOLE SODIUM 40 MG/1
40 TABLET, DELAYED RELEASE ORAL
Qty: 180 TABLET | Refills: 1 | OUTPATIENT
Start: 2019-08-14

## 2019-08-14 NOTE — TELEPHONE ENCOUNTER
Informed pt protonix was denied and would need to get it from PCP. Pt understood but wanted me to ask NPCR if she could take that medication QD instead of BID?

## 2019-08-27 ENCOUNTER — TELEPHONE (OUTPATIENT)
Dept: CARDIOLOGY CLINIC | Age: 74
End: 2019-08-27

## 2019-08-27 DIAGNOSIS — I10 ESSENTIAL HYPERTENSION: Primary | ICD-10-CM

## 2019-08-27 RX ORDER — LISINOPRIL 5 MG/1
10 TABLET ORAL DAILY
Qty: 90 TABLET | Refills: 3 | Status: SHIPPED | OUTPATIENT
Start: 2019-08-27 | End: 2019-11-07

## 2019-09-03 ENCOUNTER — HOSPITAL ENCOUNTER (OUTPATIENT)
Age: 74
Setting detail: OUTPATIENT SURGERY
Discharge: HOME | End: 2019-09-03
Attending: INTERNAL MEDICINE | Admitting: INTERNAL MEDICINE
Payer: MEDICARE

## 2019-09-03 ENCOUNTER — ANESTHESIA (OUTPATIENT)
Dept: ENDOSCOPY | Age: 74
End: 2019-09-03
Payer: MEDICARE

## 2019-09-03 VITALS
OXYGEN SATURATION: 100 % | DIASTOLIC BLOOD PRESSURE: 65 MMHG | RESPIRATION RATE: 16 BRPM | HEART RATE: 57 BPM | BODY MASS INDEX: 26.62 KG/M2 | TEMPERATURE: 49.1 F | HEIGHT: 61 IN | WEIGHT: 141 LBS | SYSTOLIC BLOOD PRESSURE: 149 MMHG

## 2019-09-03 VITALS
OXYGEN SATURATION: 100 % | SYSTOLIC BLOOD PRESSURE: 162 MMHG | DIASTOLIC BLOOD PRESSURE: 79 MMHG | RESPIRATION RATE: 23 BRPM

## 2019-09-03 PROCEDURE — 88305 TISSUE EXAM BY PATHOLOGIST: CPT

## 2019-09-03 PROCEDURE — 2580000003 HC RX 258: Performed by: ANESTHESIOLOGY

## 2019-09-03 PROCEDURE — 2720000010 HC SURG SUPPLY STERILE: Performed by: INTERNAL MEDICINE

## 2019-09-03 PROCEDURE — 2500000003 HC RX 250 WO HCPCS: Performed by: NURSE ANESTHETIST, CERTIFIED REGISTERED

## 2019-09-03 PROCEDURE — C1726 CATH, BAL DIL, NON-VASCULAR: HCPCS | Performed by: INTERNAL MEDICINE

## 2019-09-03 PROCEDURE — 3609012500 HC EGD DILATION BALLOON: Performed by: INTERNAL MEDICINE

## 2019-09-03 PROCEDURE — 7100000011 HC PHASE II RECOVERY - ADDTL 15 MIN: Performed by: INTERNAL MEDICINE

## 2019-09-03 PROCEDURE — 7100000010 HC PHASE II RECOVERY - FIRST 15 MIN: Performed by: INTERNAL MEDICINE

## 2019-09-03 PROCEDURE — 3700000001 HC ADD 15 MINUTES (ANESTHESIA): Performed by: INTERNAL MEDICINE

## 2019-09-03 PROCEDURE — 3700000000 HC ANESTHESIA ATTENDED CARE: Performed by: INTERNAL MEDICINE

## 2019-09-03 PROCEDURE — 6360000002 HC RX W HCPCS: Performed by: NURSE ANESTHETIST, CERTIFIED REGISTERED

## 2019-09-03 PROCEDURE — 3609009900 HC COLONOSCOPY W/CONTROL BLEEDING ANY METHOD: Performed by: INTERNAL MEDICINE

## 2019-09-03 PROCEDURE — 3609019800 HC COLONOSCOPY WITH SUBMUCOSAL INJECTION: Performed by: INTERNAL MEDICINE

## 2019-09-03 PROCEDURE — 2709999900 HC NON-CHARGEABLE SUPPLY: Performed by: INTERNAL MEDICINE

## 2019-09-03 PROCEDURE — 3609010600 HC COLONOSCOPY POLYPECTOMY SNARE/COLD BIOPSY: Performed by: INTERNAL MEDICINE

## 2019-09-03 PROCEDURE — 6370000000 HC RX 637 (ALT 250 FOR IP): Performed by: INTERNAL MEDICINE

## 2019-09-03 RX ORDER — SODIUM CHLORIDE 0.9 % (FLUSH) 0.9 %
10 SYRINGE (ML) INJECTION PRN
Status: DISCONTINUED | OUTPATIENT
Start: 2019-09-03 | End: 2019-09-03 | Stop reason: HOSPADM

## 2019-09-03 RX ORDER — PROPOFOL 10 MG/ML
INJECTION, EMULSION INTRAVENOUS PRN
Status: DISCONTINUED | OUTPATIENT
Start: 2019-09-03 | End: 2019-09-03 | Stop reason: SDUPTHER

## 2019-09-03 RX ORDER — SODIUM CHLORIDE 0.9 % (FLUSH) 0.9 %
10 SYRINGE (ML) INJECTION EVERY 12 HOURS SCHEDULED
Status: DISCONTINUED | OUTPATIENT
Start: 2019-09-03 | End: 2019-09-03 | Stop reason: HOSPADM

## 2019-09-03 RX ORDER — LIDOCAINE HYDROCHLORIDE 10 MG/ML
1 INJECTION, SOLUTION EPIDURAL; INFILTRATION; INTRACAUDAL; PERINEURAL
Status: DISCONTINUED | OUTPATIENT
Start: 2019-09-03 | End: 2019-09-03 | Stop reason: HOSPADM

## 2019-09-03 RX ORDER — LIDOCAINE HYDROCHLORIDE 20 MG/ML
INJECTION, SOLUTION INFILTRATION; PERINEURAL PRN
Status: DISCONTINUED | OUTPATIENT
Start: 2019-09-03 | End: 2019-09-03 | Stop reason: SDUPTHER

## 2019-09-03 RX ORDER — SODIUM CHLORIDE 9 MG/ML
INJECTION, SOLUTION INTRAVENOUS CONTINUOUS
Status: DISCONTINUED | OUTPATIENT
Start: 2019-09-03 | End: 2019-09-03 | Stop reason: HOSPADM

## 2019-09-03 RX ADMIN — PROPOFOL 10 MG: 10 INJECTION, EMULSION INTRAVENOUS at 09:43

## 2019-09-03 RX ADMIN — PROPOFOL 10 MG: 10 INJECTION, EMULSION INTRAVENOUS at 09:01

## 2019-09-03 RX ADMIN — PROPOFOL 10 MG: 10 INJECTION, EMULSION INTRAVENOUS at 09:17

## 2019-09-03 RX ADMIN — PROPOFOL 10 MG: 10 INJECTION, EMULSION INTRAVENOUS at 09:33

## 2019-09-03 RX ADMIN — LIDOCAINE HYDROCHLORIDE 60 MG: 20 INJECTION, SOLUTION INFILTRATION; PERINEURAL at 08:56

## 2019-09-03 RX ADMIN — PROPOFOL 10 MG: 10 INJECTION, EMULSION INTRAVENOUS at 09:38

## 2019-09-03 RX ADMIN — PROPOFOL 10 MG: 10 INJECTION, EMULSION INTRAVENOUS at 09:26

## 2019-09-03 RX ADMIN — PROPOFOL 10 MG: 10 INJECTION, EMULSION INTRAVENOUS at 09:04

## 2019-09-03 RX ADMIN — PROPOFOL 10 MG: 10 INJECTION, EMULSION INTRAVENOUS at 09:21

## 2019-09-03 RX ADMIN — PROPOFOL 10 MG: 10 INJECTION, EMULSION INTRAVENOUS at 09:14

## 2019-09-03 RX ADMIN — PROPOFOL 60 MG: 10 INJECTION, EMULSION INTRAVENOUS at 08:58

## 2019-09-03 RX ADMIN — PROPOFOL 10 MG: 10 INJECTION, EMULSION INTRAVENOUS at 09:41

## 2019-09-03 RX ADMIN — PROPOFOL 10 MG: 10 INJECTION, EMULSION INTRAVENOUS at 09:09

## 2019-09-03 RX ADMIN — PROPOFOL 10 MG: 10 INJECTION, EMULSION INTRAVENOUS at 09:28

## 2019-09-03 RX ADMIN — PROPOFOL 10 MG: 10 INJECTION, EMULSION INTRAVENOUS at 09:31

## 2019-09-03 RX ADMIN — PROPOFOL 10 MG: 10 INJECTION, EMULSION INTRAVENOUS at 09:24

## 2019-09-03 RX ADMIN — PROPOFOL 10 MG: 10 INJECTION, EMULSION INTRAVENOUS at 09:19

## 2019-09-03 RX ADMIN — PROPOFOL 10 MG: 10 INJECTION, EMULSION INTRAVENOUS at 09:06

## 2019-09-03 RX ADMIN — SODIUM CHLORIDE: 9 INJECTION, SOLUTION INTRAVENOUS at 08:53

## 2019-09-03 RX ADMIN — PROPOFOL 10 MG: 10 INJECTION, EMULSION INTRAVENOUS at 09:11

## 2019-09-03 RX ADMIN — PROPOFOL 10 MG: 10 INJECTION, EMULSION INTRAVENOUS at 09:35

## 2019-09-03 ASSESSMENT — PAIN SCALES - GENERAL: PAINLEVEL_OUTOF10: 0

## 2019-09-03 ASSESSMENT — PAIN - FUNCTIONAL ASSESSMENT: PAIN_FUNCTIONAL_ASSESSMENT: 0-10

## 2019-09-03 NOTE — ANESTHESIA POSTPROCEDURE EVALUATION
Department of Anesthesiology  Postprocedure Note    Patient: Suzette Zhou  MRN: [id number]  YOB: 1945  Date of evaluation: 9/3/2019  Time:  10:20 AM     Procedure Summary     Date:  09/03/19 Room / Location:  [facility] ENDO 01 / [facility] ENDOSCOPY    Anesthesia Start:  0856 Anesthesia Stop:  9966    Procedures:       COLONOSCOPY POLYPECTOMY SNARE/COLD BIOPSY (N/A )      EGD DILATION BALLOON (N/A Abdomen)      COLONOSCOPY SUBMUCOSAL/BOTOX INJECTION      COLONOSCOPY CONTROL HEMORRHAGE Diagnosis:       (K92.2 - GI BLEED)      (K57.92- DIVERTICULITIS)    Surgeon:  Andrey [place] MD Responsible Provider:  Shirley Patton MD    Anesthesia Type:  MAC ASA Status:  3          Anesthesia Type: MAC    Andres Phase I: Andres Score: 10    Andres Phase II: Andres Score: 10    Last vitals: Reviewed and per EMR flowsheets.        Anesthesia Post Evaluation    Patient location during evaluation: PACU  Patient participation: complete - patient participated  Level of consciousness: awake  Airway patency: patent  Nausea & Vomiting: no vomiting and no nausea  Complications: no  Cardiovascular status: hemodynamically stable  Respiratory status: acceptable  Hydration status: stable

## 2019-09-03 NOTE — ANESTHESIA PRE PROCEDURE
LIGATION      UPPER GASTROINTESTINAL ENDOSCOPY N/A 7/8/2019    EGD BIOPSY performed by Roberto Gan MD at [address] [place] [address] History:    Social History     Tobacco Use    Smoking status: Never Smoker    Smokeless tobacco: Never Used   Substance Use Topics    Alcohol use: Yes     Alcohol/week: 3.0 standard drinks     Types: 3 Glasses of wine per week     Comment: social                                Counseling given: Not Answered      Vital Signs (Current):   Vitals:    08/13/19 1108   Weight: 140 lb (63.5 kg)   Height: 5' 1\" (1.549 m)                                              BP Readings from Last 3 Encounters:   08/06/19 (!) 140/70   07/10/19 105/61   07/08/19 (!) 189/81       NPO Status:                                                                                 BMI:   Wt Readings from Last 3 Encounters:   08/13/19 140 lb (63.5 kg)   08/06/19 140 lb 12.8 oz (63.9 kg)   07/10/19 150 lb 4.8 oz (68.2 kg)     Body mass index is 26.45 kg/m². CBC:   Lab Results   Component Value Date    WBC 6.0 07/10/2019    RBC 3.21 07/10/2019    RBC 4.59 03/09/2017    HGB 8.6 07/10/2019    HCT 25.9 07/10/2019    MCV 80.6 07/10/2019    RDW 14.4 07/10/2019     07/10/2019       CMP:   Lab Results   Component Value Date     08/08/2019    K 4.7 08/08/2019    CL 97 08/08/2019    CO2 20 08/08/2019    BUN 24 08/08/2019    CREATININE 0.9 08/08/2019    GFRAA >60 08/08/2019    GFRAA >60 01/12/2012    AGRATIO 1.2 07/08/2019    LABGLOM >60 08/08/2019    GLUCOSE 104 08/08/2019    GLUCOSE 97 03/09/2017    PROT 6.5 07/08/2019    PROT 8.0 03/09/2017    CALCIUM 9.8 08/08/2019    BILITOT 0.3 07/08/2019    ALKPHOS 67 07/08/2019    AST 26 07/08/2019    ALT 13 07/08/2019       POC Tests: No results for input(s): POCGLU, POCNA, POCK, POCCL, POCBUN, POCHEMO, POCHCT in the last 72 hours.     Coags:   Lab Results   Component Value Date    PROTIME 15.0 07/07/2019    INR 1.32 07/07/2019    APTT 29.1 12/07/2018       HCG (If

## 2019-09-12 ENCOUNTER — HOSPITAL ENCOUNTER (OUTPATIENT)
Dept: CARDIAC CATH/INVASIVE PROCEDURES | Age: 74
Discharge: HOME | End: 2019-09-12
Attending: INTERNAL MEDICINE | Admitting: INTERNAL MEDICINE
Payer: MEDICARE

## 2019-09-12 PROCEDURE — C1764 EVENT RECORDER, CARDIAC: HCPCS

## 2019-09-12 PROCEDURE — 33285 INSJ SUBQ CAR RHYTHM MNTR: CPT | Performed by: INTERNAL MEDICINE

## 2019-09-12 PROCEDURE — 33285 INSJ SUBQ CAR RHYTHM MNTR: CPT

## 2019-09-12 NOTE — PROCEDURES
[address]     Electrophysiology Procedure Note       Date of Procedure: 9/12/2019  Patient's Name: Wing Ayoub  YOB: 1945   Medical Record Number: [id number]  Procedure Performed by: Luann Raman MD    Procedures performed:    · Loop recorder implantation    Indication of the procedure: Arrhythmia    Details of procedure:   Procedure's risks, benefits and alternatives of procedure were explained to patient. All questions were answered. Patient understood and informed consent was obtained. The patient was brought to the electrophysiology lab in a fasting nonsedated state. Patient was prepped and draped in usual sterile fashion. After injection of 2% lidocaine in the left 4th intercostal area, a 5 mm small incision was made. Using ILR insertion device, a small subcutaneous tunnel was created and the loop recorder was inserted under skin. The skin was covered with Steri-Strips. Device information:   The device is [facility]# [id number] [employer] Implant date: 9/12/2019  The device was programmed to detect:   VT: 380 ms 16 beats   Bradycardia: 2000 ms     Plan:   The patient will have usual post-implant care. Patient can be discharged home if remains stable with follow up in arrhythmia clinic.

## 2019-09-17 ENCOUNTER — TELEPHONE (OUTPATIENT)
Dept: CARDIOLOGY CLINIC | Age: 74
End: 2019-09-17

## 2019-09-17 RX ORDER — PANTOPRAZOLE SODIUM 40 MG/1
40 TABLET, DELAYED RELEASE ORAL
Qty: 180 TABLET | Refills: 1 | Status: SHIPPED | OUTPATIENT
Start: 2019-09-17 | End: 2020-04-20 | Stop reason: SDUPTHER

## 2019-09-17 NOTE — TELEPHONE ENCOUNTER
Medication Refill    Last appointment with cardiology? 08/06/19  Next appointment with Cardiology?  11/07/19    Medication needing refilled: pantoprazole (PROTONIX) 40 MG tablet    Doseage of the medication:40 mg     How are you taking this medication (QD, BID, TID, QID, PRN): 1 tab BID    Patient want a 30 or 90 day supply called in: 80    Which Pharmacy are we sending the medication to [address], [address] [place] [address] [phone]

## 2019-09-18 ENCOUNTER — NURSE ONLY (OUTPATIENT)
Dept: CARDIOLOGY CLINIC | Age: 74
End: 2019-09-18
Payer: MEDICARE

## 2019-09-18 DIAGNOSIS — Z45.09 ENCOUNTER FOR ELECTRONIC ANALYSIS OF REVEAL EVENT RECORDER: Primary | ICD-10-CM

## 2019-09-18 DIAGNOSIS — I48.0 PAF (PAROXYSMAL ATRIAL FIBRILLATION) (HCC): ICD-10-CM

## 2019-09-18 PROCEDURE — 93291 INTERROG DEV EVAL SCRMS IP: CPT | Performed by: INTERNAL MEDICINE

## 2019-09-19 ENCOUNTER — HOSPITAL ENCOUNTER (OUTPATIENT)
Age: 74
Discharge: HOME | End: 2019-09-19
Payer: MEDICARE

## 2019-09-19 ENCOUNTER — TELEPHONE (OUTPATIENT)
Dept: CARDIOLOGY CLINIC | Age: 74
End: 2019-09-19

## 2019-09-19 ENCOUNTER — OFFICE VISIT (OUTPATIENT)
Dept: PULMONOLOGY | Age: 74
End: 2019-09-19
Payer: MEDICARE

## 2019-09-19 VITALS
SYSTOLIC BLOOD PRESSURE: 148 MMHG | WEIGHT: 143 LBS | OXYGEN SATURATION: 95 % | BODY MASS INDEX: 27.02 KG/M2 | HEART RATE: 70 BPM | RESPIRATION RATE: 16 BRPM | DIASTOLIC BLOOD PRESSURE: 80 MMHG

## 2019-09-19 DIAGNOSIS — J45.991 COUGH VARIANT ASTHMA: Primary | ICD-10-CM

## 2019-09-19 DIAGNOSIS — I10 ESSENTIAL HYPERTENSION: ICD-10-CM

## 2019-09-19 LAB
ANION GAP SERPL CALCULATED.3IONS-SCNC: 13 MMOL/L (ref 3–16)
BUN BLDV-MCNC: 14 MG/DL (ref 7–20)
CALCIUM SERPL-MCNC: 9.6 MG/DL (ref 8.3–10.6)
CHLORIDE BLD-SCNC: 100 MMOL/L (ref 99–110)
CO2: 22 MMOL/L (ref 21–32)
CREAT SERPL-MCNC: 0.7 MG/DL (ref 0.6–1.2)
GFR AFRICAN AMERICAN: >60
GFR NON-AFRICAN AMERICAN: >60
GLUCOSE BLD-MCNC: 96 MG/DL (ref 70–99)
POTASSIUM SERPL-SCNC: 4.7 MMOL/L (ref 3.5–5.1)
SODIUM BLD-SCNC: 135 MMOL/L (ref 136–145)

## 2019-09-19 PROCEDURE — 36415 COLL VENOUS BLD VENIPUNCTURE: CPT

## 2019-09-19 PROCEDURE — 80048 BASIC METABOLIC PNL TOTAL CA: CPT

## 2019-09-19 PROCEDURE — 99214 OFFICE O/P EST MOD 30 MIN: CPT | Performed by: INTERNAL MEDICINE

## 2019-09-19 RX ORDER — PREDNISONE 10 MG/1
TABLET ORAL
Qty: 30 TABLET | Refills: 0 | Status: SHIPPED | OUTPATIENT
Start: 2019-09-19 | End: 2019-09-29

## 2019-09-19 ASSESSMENT — ENCOUNTER SYMPTOMS
ANAL BLEEDING: 0
APNEA: 0
WHEEZING: 0
COUGH: 1
ABDOMINAL PAIN: 0
RHINORRHEA: 0
ABDOMINAL DISTENTION: 0
SHORTNESS OF BREATH: 0
BACK PAIN: 0
CHOKING: 0
SORE THROAT: 0
CHEST TIGHTNESS: 1
DIARRHEA: 0
CONSTIPATION: 0
STRIDOR: 0
SINUS PRESSURE: 0
VOICE CHANGE: 0
BLOOD IN STOOL: 0

## 2019-09-19 NOTE — PROGRESS NOTES
Patrick Perez    YOB: 1945     Date of Service:  9/19/2019     Chief Complaint   Patient presents with    Cough     worse at nightime          HPI patient has been referred by Dr. Leeanne Justin for persistent cough. Feels that she has to have active phlegm and this is thick and tenacious. Not sure if this could be related to postnasal drip or GERD. No significant dyspnea. Recent loop recorder insertion on 9/12 for history of atrial fibrillation. Allergies   Allergen Reactions    Amlodipine Other (See Comments)     Leg swelling    Pravastatin Other (See Comments)     Muscle aches    Nitrofurantoin Nausea And Vomiting and Other (See Comments)     Body aches, chills    Sulfa Antibiotics Rash     Outpatient Medications Marked as Taking for the 9/19/19 encounter (Office Visit) with Arcenio Parra MD   Medication Sig Dispense Refill    predniSONE (DELTASONE) 10 MG tablet Take 40 mg by mouth for 3 days 30 mg for 3 days 20 mg for 3 days 10 mg for 3 days.  30 tablet 0       Immunization History   Administered Date(s) Administered    DT (pediatric) 01/01/2001    Influenza, High Dose (Fluzone 65 yrs and older) 10/11/2018    Pneumococcal Conjugate 13-valent ([id number]) 10/05/2017    Pneumococcal Polysaccharide ([id number]) 01/01/2012    Tdap (Boostrix, Adacel) 01/01/2012    Zoster Live (Zostavax) 01/01/2012       Past Medical History:   Diagnosis Date    Celiac sprue     GERD (gastroesophageal reflux disease)     Hyperlipidemia     Hypertension     Osteoarthritis     Osteoporosis     Vitiligo      Past Surgical History:   Procedure Laterality Date    BRONCHOSCOPY N/A 12/7/2018    BRONCHOSCOPY ALVEOLAR LAVAGE performed by Barbi Walker MD at [address] [address]    NORMAL    COLONOSCOPY  4/10/2014    COLONOSCOPY N/A 9/3/2019    COLONOSCOPY POLYPECTOMY SNARE/COLD BIOPSY performed by Emerson Norton MD at [facility] COLONOSCOPY  9/3/2019 Will also obtain respiratory allergen panel to guide is about allergies. Patient is currently wheezing, will prescribe a course of prednisone taper. Patient may need to go back on inhaled corticosteroids if the testing above is suggestive of asthma. Patient is already on Protonix 40 mg twice daily, recent EGD done on 9/3 did not show any evidence of esophagitis. Reviewed patient's CT from June which shows chronic interstitial opacities with some groundglass infiltrates. These are very nonspecific. Return in about 4 weeks (around 10/17/2019).

## 2019-09-20 NOTE — TELEPHONE ENCOUNTER
Notified patient of NP's instructions below. Patient verbalized understanding and will make appt with PCP.

## 2019-10-17 ENCOUNTER — HOSPITAL ENCOUNTER (OUTPATIENT)
Dept: PULMONOLOGY | Age: 74
Discharge: HOME | End: 2019-10-17
Payer: MEDICARE

## 2019-10-17 ENCOUNTER — HOSPITAL ENCOUNTER (OUTPATIENT)
Age: 74
Discharge: HOME | End: 2019-10-17
Payer: MEDICARE

## 2019-10-17 VITALS — HEART RATE: 63 BPM | RESPIRATION RATE: 16 BRPM | OXYGEN SATURATION: 93 %

## 2019-10-17 DIAGNOSIS — J45.991 COUGH VARIANT ASTHMA: ICD-10-CM

## 2019-10-17 PROCEDURE — 94010 BREATHING CAPACITY TEST: CPT

## 2019-10-17 PROCEDURE — 6370000000 HC RX 637 (ALT 250 FOR IP): Performed by: INTERNAL MEDICINE

## 2019-10-17 PROCEDURE — 86003 ALLG SPEC IGE CRUDE XTRC EA: CPT

## 2019-10-17 PROCEDURE — 94070 EVALUATION OF WHEEZING: CPT

## 2019-10-17 PROCEDURE — 6360000002 HC RX W HCPCS: Performed by: INTERNAL MEDICINE

## 2019-10-17 PROCEDURE — 94760 N-INVAS EAR/PLS OXIMETRY 1: CPT

## 2019-10-17 PROCEDURE — 82785 ASSAY OF IGE: CPT

## 2019-10-17 RX ORDER — ALBUTEROL SULFATE 2.5 MG/3ML
2.5 SOLUTION RESPIRATORY (INHALATION) ONCE
Status: COMPLETED | OUTPATIENT
Start: 2019-10-17 | End: 2019-10-17

## 2019-10-17 RX ORDER — SODIUM CHLORIDE FOR INHALATION 0.9 %
3 VIAL, NEBULIZER (ML) INHALATION ONCE
Status: COMPLETED | OUTPATIENT
Start: 2019-10-17 | End: 2019-10-17

## 2019-10-17 RX ADMIN — METHACHOLINE CHLORIDE 1 MG: 100 POWDER, FOR SOLUTION RESPIRATORY (INHALATION) at 08:55

## 2019-10-17 RX ADMIN — METHACHOLINE CHLORIDE 4 MG: 100 POWDER, FOR SOLUTION RESPIRATORY (INHALATION) at 09:00

## 2019-10-17 RX ADMIN — ALBUTEROL SULFATE 2.5 MG: 2.5 SOLUTION RESPIRATORY (INHALATION) at 09:06

## 2019-10-17 RX ADMIN — METHACHOLINE CHLORIDE 0.06 MG: 100 POWDER, FOR SOLUTION RESPIRATORY (INHALATION) at 08:47

## 2019-10-17 RX ADMIN — ISODIUM CHLORIDE 3 ML: 0.03 SOLUTION RESPIRATORY (INHALATION) at 08:25

## 2019-10-17 RX ADMIN — METHACHOLINE CHLORIDE 16 MG: 100 POWDER, FOR SOLUTION RESPIRATORY (INHALATION) at 09:04

## 2019-10-17 RX ADMIN — METHACHOLINE CHLORIDE 0.25 MG: 100 POWDER, FOR SOLUTION RESPIRATORY (INHALATION) at 08:51

## 2019-10-19 LAB
2000687N OAK TREE IGE: 0.52 KU/L
ALLERGEN ASPERGILLUS FUMIGATUS IGE: <0.1 KU/L
ALLERGEN BERMUDA GRASS IGE: 0.25 KU/L
ALLERGEN BIRCH IGE: 0.75 KU/L
ALLERGEN CAT DANDER IGE: <0.1 KU/L
ALLERGEN COMMON SHORT RAGWEED IGE: 0.85 KU/L
ALLERGEN COTTONWOOD: 0.17 KU/L
ALLERGEN DOG DANDER IGE: <0.1 KU/L
ALLERGEN ELM IGE: 0.28 KU/L
ALLERGEN FUNGI/MOLD M.RACEMOSUS IGE: <0.1 KU/L
ALLERGEN GERMAN COCKROACH IGE: <0.1 KU/L
ALLERGEN HORMODENDRUM HORDEI IGE: <0.1 KU/L
ALLERGEN MAPLE/BOX ELDER IGE: 0.27 KU/L
ALLERGEN MITE DUST FARINAE IGE: <0.1 KU/L
ALLERGEN MITE DUST PTERONYSSINUS IGE: <0.1 KU/L
ALLERGEN MOUNTAIN CEDAR: 0.18 KU/L
ALLERGEN MOUSE EPITHELIA IGE: <0.1 KU/L
ALLERGEN PECAN TREE IGE: 0.81 KU/L
ALLERGEN PENICILLIUM NOTATUM: <0.1 KU/L
ALLERGEN ROUGH PIGWEED (W14) IGE: 0.28 KU/L
ALLERGEN RUSSIAN THISTLE IGE: 0.22 KU/L
ALLERGEN SEE NOTE: ABNORMAL
ALLERGEN SHEEP SORREL (W18) IGE: 0.25 KU/L
ALLERGEN TIMOTHY GRASS: 0.14 KU/L
ALLERGEN TREE SYCAMORE: 0.18 KU/L
ALLERGEN WALNUT TREE IGE: 0.55 KU/L
ALLERGEN WHITE MULBERRY TREE, IGE: <0.1 KU/L
ALLERGEN, TREE, WHITE ASH IGE: 1.61 KU/L
IGE: 43 KU/L

## 2019-10-21 ENCOUNTER — TELEPHONE (OUTPATIENT)
Dept: PULMONOLOGY | Age: 74
End: 2019-10-21

## 2019-10-21 RX ORDER — MONTELUKAST SODIUM 10 MG/1
10 TABLET ORAL NIGHTLY
Qty: 30 TABLET | Refills: 3 | OUTPATIENT
Start: 2019-10-21

## 2019-10-22 ENCOUNTER — NURSE ONLY (OUTPATIENT)
Dept: CARDIOLOGY CLINIC | Age: 74
End: 2019-10-22
Payer: MEDICARE

## 2019-10-22 DIAGNOSIS — Z45.09 ENCOUNTER FOR ELECTRONIC ANALYSIS OF REVEAL EVENT RECORDER: ICD-10-CM

## 2019-10-22 DIAGNOSIS — I48.0 PAF (PAROXYSMAL ATRIAL FIBRILLATION) (HCC): ICD-10-CM

## 2019-10-22 PROCEDURE — 93298 REM INTERROG DEV EVAL SCRMS: CPT | Performed by: INTERNAL MEDICINE

## 2019-10-22 PROCEDURE — 93299 PR REM INTERROG ICPMS/SCRMS <30 D TECH REVIEW: CPT | Performed by: INTERNAL MEDICINE

## 2019-10-24 ENCOUNTER — OFFICE VISIT (OUTPATIENT)
Dept: PULMONOLOGY | Age: 74
End: 2019-10-24
Payer: MEDICARE

## 2019-10-24 VITALS
SYSTOLIC BLOOD PRESSURE: 124 MMHG | WEIGHT: 147 LBS | HEART RATE: 71 BPM | BODY MASS INDEX: 27.78 KG/M2 | OXYGEN SATURATION: 98 % | DIASTOLIC BLOOD PRESSURE: 76 MMHG | RESPIRATION RATE: 16 BRPM

## 2019-10-24 DIAGNOSIS — R93.89 ABNORMAL CT SCAN: ICD-10-CM

## 2019-10-24 DIAGNOSIS — Z23 NEED FOR IMMUNIZATION AGAINST INFLUENZA: Primary | ICD-10-CM

## 2019-10-24 DIAGNOSIS — J30.2 SEASONAL ALLERGIES: ICD-10-CM

## 2019-10-24 DIAGNOSIS — J45.40 MODERATE PERSISTENT ASTHMA WITHOUT COMPLICATION: ICD-10-CM

## 2019-10-24 PROCEDURE — 99214 OFFICE O/P EST MOD 30 MIN: CPT | Performed by: INTERNAL MEDICINE

## 2019-10-24 PROCEDURE — G0008 ADMIN INFLUENZA VIRUS VAC: HCPCS | Performed by: INTERNAL MEDICINE

## 2019-10-24 PROCEDURE — 90653 IIV ADJUVANT VACCINE IM: CPT | Performed by: INTERNAL MEDICINE

## 2019-11-07 ENCOUNTER — OFFICE VISIT (OUTPATIENT)
Dept: CARDIOLOGY CLINIC | Age: 74
End: 2019-11-07
Payer: MEDICARE

## 2019-11-07 ENCOUNTER — PROCEDURE VISIT (OUTPATIENT)
Dept: CARDIOLOGY CLINIC | Age: 74
End: 2019-11-07

## 2019-11-07 VITALS
HEART RATE: 68 BPM | DIASTOLIC BLOOD PRESSURE: 92 MMHG | RESPIRATION RATE: 20 BRPM | BODY MASS INDEX: 27.94 KG/M2 | HEIGHT: 61 IN | SYSTOLIC BLOOD PRESSURE: 190 MMHG | WEIGHT: 148 LBS

## 2019-11-07 DIAGNOSIS — Z45.09 ENCOUNTER FOR ELECTRONIC ANALYSIS OF REVEAL EVENT RECORDER: ICD-10-CM

## 2019-11-07 DIAGNOSIS — I10 ESSENTIAL HYPERTENSION, BENIGN: ICD-10-CM

## 2019-11-07 DIAGNOSIS — I48.0 PAF (PAROXYSMAL ATRIAL FIBRILLATION) (HCC): Primary | ICD-10-CM

## 2019-11-07 DIAGNOSIS — E03.9 HYPOTHYROIDISM, UNSPECIFIED TYPE: ICD-10-CM

## 2019-11-07 DIAGNOSIS — E78.2 MIXED HYPERLIPIDEMIA: ICD-10-CM

## 2019-11-07 PROCEDURE — 99214 OFFICE O/P EST MOD 30 MIN: CPT | Performed by: NURSE PRACTITIONER

## 2019-11-07 PROCEDURE — 93000 ELECTROCARDIOGRAM COMPLETE: CPT | Performed by: NURSE PRACTITIONER

## 2019-11-07 RX ORDER — MONTELUKAST SODIUM 10 MG/1
10 TABLET ORAL NIGHTLY
COMMUNITY
End: 2019-11-21

## 2019-11-07 RX ORDER — LISINOPRIL 5 MG/1
5 TABLET ORAL DAILY
Qty: 90 TABLET | Refills: 3
Start: 2019-11-07 | End: 2020-03-04

## 2019-11-21 ENCOUNTER — OFFICE VISIT (OUTPATIENT)
Dept: PULMONOLOGY | Age: 74
End: 2019-11-21
Payer: MEDICARE

## 2019-11-21 VITALS
BODY MASS INDEX: 27.59 KG/M2 | RESPIRATION RATE: 16 BRPM | WEIGHT: 146 LBS | OXYGEN SATURATION: 96 % | HEART RATE: 58 BPM | DIASTOLIC BLOOD PRESSURE: 80 MMHG | SYSTOLIC BLOOD PRESSURE: 128 MMHG

## 2019-11-21 DIAGNOSIS — J45.991 COUGH VARIANT ASTHMA: Primary | ICD-10-CM

## 2019-11-21 DIAGNOSIS — K21.9 GASTROESOPHAGEAL REFLUX DISEASE WITHOUT ESOPHAGITIS: ICD-10-CM

## 2019-11-21 PROCEDURE — 99214 OFFICE O/P EST MOD 30 MIN: CPT | Performed by: INTERNAL MEDICINE

## 2019-11-21 RX ORDER — MONTELUKAST SODIUM 10 MG/1
10 TABLET ORAL NIGHTLY
Qty: 90 TABLET | Refills: 3 | Status: SHIPPED | OUTPATIENT
Start: 2019-11-21 | End: 2020-08-31 | Stop reason: SDUPTHER

## 2019-11-21 RX ORDER — MONTELUKAST SODIUM 10 MG/1
10 TABLET ORAL NIGHTLY
Qty: 30 TABLET | Refills: 3
Start: 2019-11-21 | End: 2019-11-21 | Stop reason: SDUPTHER

## 2019-11-21 ASSESSMENT — ENCOUNTER SYMPTOMS
WHEEZING: 1
CHEST TIGHTNESS: 0
ABDOMINAL PAIN: 0
VOICE CHANGE: 0
COUGH: 1
DIARRHEA: 0
SHORTNESS OF BREATH: 1
STRIDOR: 0
CONSTIPATION: 0
SINUS PRESSURE: 0
APNEA: 0
BACK PAIN: 0
ABDOMINAL DISTENTION: 0
ANAL BLEEDING: 0
CHOKING: 0
SORE THROAT: 0
BLOOD IN STOOL: 0
RHINORRHEA: 0

## 2019-12-10 ENCOUNTER — NURSE ONLY (OUTPATIENT)
Dept: CARDIOLOGY CLINIC | Age: 74
End: 2019-12-10
Payer: MEDICARE

## 2019-12-10 DIAGNOSIS — Z45.09 ENCOUNTER FOR ELECTRONIC ANALYSIS OF REVEAL EVENT RECORDER: ICD-10-CM

## 2019-12-10 DIAGNOSIS — I48.0 PAF (PAROXYSMAL ATRIAL FIBRILLATION) (HCC): ICD-10-CM

## 2019-12-10 PROCEDURE — 93299 PR REM INTERROG ICPMS/SCRMS <30 D TECH REVIEW: CPT | Performed by: INTERNAL MEDICINE

## 2019-12-10 PROCEDURE — 93298 REM INTERROG DEV EVAL SCRMS: CPT | Performed by: INTERNAL MEDICINE

## 2019-12-28 ENCOUNTER — HOSPITAL ENCOUNTER (OUTPATIENT)
Dept: WOMENS IMAGING | Age: 74
Discharge: HOME | End: 2019-12-28
Payer: MEDICARE

## 2019-12-28 DIAGNOSIS — Z12.31 ENCOUNTER FOR SCREENING MAMMOGRAM FOR BREAST CANCER: ICD-10-CM

## 2019-12-28 PROCEDURE — 77063 BREAST TOMOSYNTHESIS BI: CPT

## 2020-02-09 PROCEDURE — G2066 INTER DEVC REMOTE 30D: HCPCS | Performed by: INTERNAL MEDICINE

## 2020-02-09 PROCEDURE — 93298 REM INTERROG DEV EVAL SCRMS: CPT | Performed by: INTERNAL MEDICINE

## 2020-02-11 ENCOUNTER — NURSE ONLY (OUTPATIENT)
Dept: CARDIOLOGY CLINIC | Age: 75
End: 2020-02-11
Payer: MEDICARE

## 2020-02-14 ENCOUNTER — OFFICE VISIT (OUTPATIENT)
Dept: CARDIOLOGY CLINIC | Age: 75
End: 2020-02-14
Payer: MEDICARE

## 2020-02-14 ENCOUNTER — TELEPHONE (OUTPATIENT)
Dept: CARDIOLOGY CLINIC | Age: 75
End: 2020-02-14

## 2020-02-14 VITALS
HEIGHT: 61 IN | DIASTOLIC BLOOD PRESSURE: 60 MMHG | HEART RATE: 65 BPM | BODY MASS INDEX: 27.38 KG/M2 | WEIGHT: 145 LBS | OXYGEN SATURATION: 97 % | SYSTOLIC BLOOD PRESSURE: 136 MMHG

## 2020-02-14 PROCEDURE — 99204 OFFICE O/P NEW MOD 45 MIN: CPT | Performed by: INTERNAL MEDICINE

## 2020-02-14 NOTE — TELEPHONE ENCOUNTER
Dr. Aletha Mcdaniel Or saw this patient today but she also sees EP. She said that she has not been getting the letters that Marquis Zarco generates with each ILR interrogation. She is concerned because she was not aware of an episode of AF from Sept Valerie Antonio discuss this with her at [address] in Nov) stating she has not received the letters. I was just double checking on how patient's receive the letters -- are they mailed, My Chart, ?? I gave her copies of the last 2 letters and the one describing the AF episode; I did tell her that if the device showed nothing alarming, the letters are in a standard format. Thank you.

## 2020-02-18 NOTE — TELEPHONE ENCOUNTER
Status: Pt's ADLs have improved  He is childlike & perseverates on leaving  He wants in his locker a lot  He was playing the Wii yesterday, very happy  Medication: no changes    D/C: EAC - supervisor suggested filing [id number] on 11/14 11/11/19 [id number]   Team Meeting   Meeting Type Daily Rounds   Team Members Present   Team Members Present Physician;Nurse;;Occupational Therapist   Physician Team Member XUAN Jung / Dr Terri Morelos Team Member [facility] Management Team Member Jocelyn Lemon / Sabrina Ghosh   OT Team Member Maria Guadalupe   Patient/Family Present   Patient Present No   Patient's Family Present No Will mail letters from here on out.

## 2020-02-18 NOTE — TELEPHONE ENCOUNTER
If a Patient has an Active MyChart then they will receive the letter through [address] Otherwise they get printed and mailed.

## 2020-02-20 ENCOUNTER — OFFICE VISIT (OUTPATIENT)
Dept: PULMONOLOGY | Age: 75
End: 2020-02-20
Payer: MEDICARE

## 2020-02-20 VITALS
BODY MASS INDEX: 27.75 KG/M2 | WEIGHT: 147 LBS | HEIGHT: 61 IN | DIASTOLIC BLOOD PRESSURE: 82 MMHG | SYSTOLIC BLOOD PRESSURE: 158 MMHG | RESPIRATION RATE: 18 BRPM | HEART RATE: 67 BPM | OXYGEN SATURATION: 98 %

## 2020-02-20 PROCEDURE — 99213 OFFICE O/P EST LOW 20 MIN: CPT | Performed by: INTERNAL MEDICINE

## 2020-02-20 ASSESSMENT — ENCOUNTER SYMPTOMS
VOICE CHANGE: 1
BLOOD IN STOOL: 0
ABDOMINAL DISTENTION: 0
SHORTNESS OF BREATH: 0
WHEEZING: 0
BACK PAIN: 0
ANAL BLEEDING: 0
SORE THROAT: 0
STRIDOR: 0
DIARRHEA: 0
COUGH: 1
SINUS PRESSURE: 0
CHOKING: 0
CHEST TIGHTNESS: 0
ABDOMINAL PAIN: 0
CONSTIPATION: 0
RHINORRHEA: 0
APNEA: 0

## 2020-02-20 NOTE — PROGRESS NOTES
[facility] ENDOSCOPY    TUBAL LIGATION      UPPER GASTROINTESTINAL ENDOSCOPY N/A 7/8/2019    EGD BIOPSY performed by Jarod Bianchi MD at [facility] N/A 9/3/2019    EGD DILATION BALLOON performed by Jarod Bianchi MD at [facility]     Family History   Problem Relation Age of Onset    Kidney Disease Mother     Diabetes Mother     Heart Disease Mother     Arthritis Mother     Cancer Father     Arthritis Father        Review of Systems:  Review of Systems   Constitutional: Negative for activity change, appetite change, fatigue and fever. HENT: Positive for voice change. Negative for congestion, ear discharge, ear pain, postnasal drip, rhinorrhea, sinus pressure, sneezing, sore throat and tinnitus. Respiratory: Positive for cough. Negative for apnea, choking, chest tightness, shortness of breath, wheezing and stridor. Cardiovascular: Negative for chest pain, palpitations and leg swelling. Gastrointestinal: Negative for abdominal distention, abdominal pain, anal bleeding, blood in stool, constipation and diarrhea. Musculoskeletal: Negative for arthralgias, back pain and gait problem. Skin: Negative for pallor and rash. Allergic/Immunologic: Negative for environmental allergies. Neurological: Negative for dizziness, tremors, seizures, syncope, speech difficulty, weakness, light-headedness, numbness and headaches. Hematological: Negative for adenopathy. Does not bruise/bleed easily. Psychiatric/Behavioral: Negative for sleep disturbance. Vitals:    02/20/20 1255 02/20/20 1258   BP: (!) 162/88 (!) 158/82   Pulse: 67 67   Resp: 18    SpO2: 98%    Weight: 147 lb (66.7 kg)    Height: 5' 1\" (1.549 m)      Body mass index is 27.78 kg/m².      Wt Readings from Last 3 Encounters:   02/20/20 147 lb (66.7 kg)   02/14/20 145 lb (65.8 kg)   11/21/19 146 lb (66.2 kg)     BP Readings from Last 3 Encounters:   02/20/20 (!) 158/82   02/14/20 136/60   11/21/19 symptoms. I will touch base with cardiology about ? Switching ACE inhibitor to ARB, currently on lisinopril 5 mg twice daily. Reviewed prior CT imaging done in June 2019-shows some peripheral interstitial fibrosis/groundglass opacities. Will repeat another CT imaging. If no improvement with above changes, then would recommend another ENT evaluation    Return in about 3 months (around 5/20/2020).

## 2020-02-21 RX ORDER — PANTOPRAZOLE SODIUM 40 MG/1
40 TABLET, DELAYED RELEASE ORAL
Qty: 180 TABLET | Refills: 1 | OUTPATIENT
Start: 2020-02-21

## 2020-02-21 NOTE — TELEPHONE ENCOUNTER
This is not a cardiac medication. This should be prescribed by her PCP or GI if needed.  Please advise pt    MEHRDAD Rodriguez-BARBARA

## 2020-02-21 NOTE — TELEPHONE ENCOUNTER
Medication Refill    Medication needing refilled: pantoprazole (PROTONIX)    Doseage of the medication: 40 mg    How are you taking this medication (QD, BID, TID, QID, PRN): daily    30 or 90 day supply called in: 90 day supply    Which Pharmacy are we sending the medication to?: [facility] [address] [place] [address], [address] [place] [address] [phone]

## 2020-02-24 ENCOUNTER — TELEPHONE (OUTPATIENT)
Dept: PULMONOLOGY | Age: 75
End: 2020-02-24

## 2020-02-25 ENCOUNTER — TELEPHONE (OUTPATIENT)
Dept: CARDIOLOGY CLINIC | Age: 75
End: 2020-02-25

## 2020-02-25 NOTE — TELEPHONE ENCOUNTER
Pt states she has asthma and persistent cough. Dr Orvin Holter asking if lisionpril could be contributing and asking [facility]. Please call pt to advise.

## 2020-02-26 NOTE — TELEPHONE ENCOUNTER
5/2016 Dr Torres Macario documented Etiology of acute renal failure, most likely related to possible UTI/sepsis and possible  antibiotics. ( was on cipro, rocephin)  She is also noted to be on long-term omeprazole, and there are recent reports, suggesting possible nephrotoxicity. Dr Zoraida Adamson documented ARF possibly due to valsartan  All nephrotoxins stopped, has not had any renal failure since, last creat nl (9/2019 creat 0.7)  Called , who confirmed above.  Two days ago they decreased lisinopril to 5 mg once a day to see if this decreases the cough--they will start checking her blood pressure  Told him I would call back later with your decision on bp management

## 2020-02-26 NOTE — TELEPHONE ENCOUNTER
Per [facility]-Please have patient see an NP in the next 2 weeks to discuss staying on lower dose Lisinopril or switching to ARB. ARB likely wasn't cause of renal issues in 2016.

## 2020-02-27 NOTE — TELEPHONE ENCOUNTER
Pt called in stating that she does have an appt with her cardiologist on 3/9 to discuss the lisinopril and the Cozaar. Requesting a call back from Sho Lira in regards to this.      Pt # [phone]

## 2020-03-04 ENCOUNTER — TELEPHONE (OUTPATIENT)
Dept: CARDIOLOGY CLINIC | Age: 75
End: 2020-03-04

## 2020-03-04 ENCOUNTER — OFFICE VISIT (OUTPATIENT)
Dept: CARDIOLOGY CLINIC | Age: 75
End: 2020-03-04
Payer: MEDICARE

## 2020-03-04 ENCOUNTER — NURSE ONLY (OUTPATIENT)
Dept: CARDIOLOGY CLINIC | Age: 75
End: 2020-03-04

## 2020-03-04 VITALS
WEIGHT: 149 LBS | BODY MASS INDEX: 28.13 KG/M2 | HEIGHT: 61 IN | DIASTOLIC BLOOD PRESSURE: 76 MMHG | HEART RATE: 63 BPM | SYSTOLIC BLOOD PRESSURE: 166 MMHG

## 2020-03-04 PROCEDURE — 93000 ELECTROCARDIOGRAM COMPLETE: CPT | Performed by: NURSE PRACTITIONER

## 2020-03-04 PROCEDURE — 99214 OFFICE O/P EST MOD 30 MIN: CPT | Performed by: NURSE PRACTITIONER

## 2020-03-04 RX ORDER — DILTIAZEM HYDROCHLORIDE 180 MG/1
180 CAPSULE, COATED, EXTENDED RELEASE ORAL DAILY
Qty: 90 CAPSULE | Refills: 1 | Status: SHIPPED | OUTPATIENT
Start: 2020-03-04 | End: 2020-05-21

## 2020-03-04 NOTE — PATIENT INSTRUCTIONS
Plan:  1. Increase cardizem to 180 mg daily  2. May use cardizem 30 mg for episodes of afib  3. Start monitoring your blood pressure 2-3 times per week  4.  Follow up with GI for your colonoscopy    F/U: Follow-up with NPTS next week and Dr. Ramo Garcia in 4 months   -Follow up with device clinic as scheduled  -[place] [address] at [phone] with any questions
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
## 2020-03-04 NOTE — PROGRESS NOTES
Rx for eliquis and Xarelto sent to pt's pharmacy to determine cost coverage. Pt to follow up in office today to discuss POC further.      Raheem Jackson, APRN-CNP

## 2020-03-05 ENCOUNTER — HOSPITAL ENCOUNTER (OUTPATIENT)
Dept: CT IMAGING | Age: 75
Discharge: HOME | End: 2020-03-05
Payer: MEDICARE

## 2020-03-05 PROCEDURE — 71250 CT THORAX DX C-: CPT

## 2020-03-06 ENCOUNTER — TELEPHONE (OUTPATIENT)
Dept: PULMONOLOGY | Age: 75
End: 2020-03-06

## 2020-03-07 ENCOUNTER — HOSPITAL ENCOUNTER (OUTPATIENT)
Age: 75
Discharge: HOME | End: 2020-03-07
Payer: MEDICARE

## 2020-03-07 LAB
RHEUMATOID FACTOR: 16 IU/ML
SEDIMENTATION RATE, ERYTHROCYTE: 54 MM/HR (ref 0–30)
TOTAL CK: 98 U/L (ref 26–192)

## 2020-03-07 PROCEDURE — 86200 CCP ANTIBODY: CPT

## 2020-03-07 PROCEDURE — 86331 IMMUNODIFFUSION OUCHTERLONY: CPT

## 2020-03-07 PROCEDURE — 86039 ANTINUCLEAR ANTIBODIES (ANA): CPT

## 2020-03-07 PROCEDURE — 86431 RHEUMATOID FACTOR QUANT: CPT

## 2020-03-07 PROCEDURE — 86606 ASPERGILLUS ANTIBODY: CPT

## 2020-03-07 PROCEDURE — 85652 RBC SED RATE AUTOMATED: CPT

## 2020-03-07 PROCEDURE — 86038 ANTINUCLEAR ANTIBODIES: CPT

## 2020-03-07 PROCEDURE — 86225 DNA ANTIBODY NATIVE: CPT

## 2020-03-07 PROCEDURE — 82550 ASSAY OF CK (CPK): CPT

## 2020-03-07 PROCEDURE — 86005 ALLG SPEC IGE MULTIALLG SCR: CPT

## 2020-03-07 PROCEDURE — 86003 ALLG SPEC IGE CRUDE XTRC EA: CPT

## 2020-03-07 PROCEDURE — 86255 FLUORESCENT ANTIBODY SCREEN: CPT

## 2020-03-07 PROCEDURE — 86235 NUCLEAR ANTIGEN ANTIBODY: CPT

## 2020-03-07 PROCEDURE — 36415 COLL VENOUS BLD VENIPUNCTURE: CPT

## 2020-03-07 PROCEDURE — 82085 ASSAY OF ALDOLASE: CPT

## 2020-03-08 LAB
ANTI-DSDNA IGG: 1 IU/ML (ref 0–9)
ANTI-JO1 IGG: <0.2 AI (ref 0–0.9)
ANTI-SCL70 IGG: 3.1 AI (ref 0–0.9)
ANTI-SS-A IGG: <0.2 AI (ref 0–0.9)
ANTI-SS-B IGG: <0.2 AI (ref 0–0.9)
CYCLIC CITRULLINATED PEPTIDE ANTIBODY IGG: 0.5 U/ML (ref 0–2.9)

## 2020-03-09 ENCOUNTER — OFFICE VISIT (OUTPATIENT)
Dept: CARDIOLOGY CLINIC | Age: 75
End: 2020-03-09
Payer: MEDICARE

## 2020-03-09 VITALS
HEART RATE: 68 BPM | SYSTOLIC BLOOD PRESSURE: 130 MMHG | BODY MASS INDEX: 26.81 KG/M2 | WEIGHT: 142 LBS | DIASTOLIC BLOOD PRESSURE: 80 MMHG | HEIGHT: 61 IN | OXYGEN SATURATION: 94 %

## 2020-03-09 LAB
ALDOLASE: 2.4 U/L (ref 1.5–8.1)
ANA INTERPRETATION: ABNORMAL
ANA PATTERN: ABNORMAL
ANA TITER: ABNORMAL
ANTI-NUCLEAR ANTIBODY (ANA): POSITIVE
PATHOLOGIST: ABNORMAL

## 2020-03-09 PROCEDURE — 99214 OFFICE O/P EST MOD 30 MIN: CPT | Performed by: NURSE PRACTITIONER

## 2020-03-09 RX ORDER — MULTIVIT-MIN/IRON/FOLIC ACID/K 18-600-40
CAPSULE ORAL
COMMUNITY

## 2020-03-09 RX ORDER — ACETAMINOPHEN 325 MG/1
650 TABLET ORAL EVERY 6 HOURS PRN
COMMUNITY

## 2020-03-09 NOTE — PROGRESS NOTES
[facility]     Outpatient Follow Up Note    CHIEF COMPLAINT / HPI:Follow Up secondary to atrial fibrillation/  Hypertension/ hyperlipidemia      Leatha Jordan is 66 y.o. female who presents today for a routine follow uprelated to the above mentioned issues. Subjective:   Since the time of last office visit, the patient admits their symptoms have changed. Leatha Jordan is 66 y.o. female with a past medical history of HTN, HLD, GERD, and OA. Recently started on PO antibiotics following CT scan showing diverticulitis accompanied by N/V and lower abd pain.      In July of 2019, pt presented to ER with black tarry stools and diarrhea. Had EGD, which showed non-bleeding pyloric and duodenal ulcerations. Biopsies were sent and recommended repeat EGD in 8 weeks. During this admission, she briefly flipped into afib RVR, which resolved with x1 dose of IV cardizem. No prior hx of AF/arrhythmias. Unable to start anticoagulation due to GIB so discharged with event monitor. S/p ILR (9/12/19).          Patient was seen per EP on   3/4/2020 due to  loop recorder showed atrial fibrillation on 3/3/2020. Her last episode of afib was on 9/26/19, but she did not start [address] [place] [address] at that time due to concerns for GI bleeding and need for a f/u colonoscopy, which never occurred. Patient is been seen today for a routine visit. Patient was seen per pulmonary on 2/20/2020 due to a dry cough- D/C Lisinopril. Patient stated at today's OV the cough has improved. She denies any chest pain, palpitations, SOB, dizziness, or edema. With regard to medication therapy the patient has been compliant with prescribed regimen. They have not tolerated therapy to date.      Past Medical History:   Diagnosis Date    Celiac sprue     GERD (gastroesophageal reflux disease)     Hyperlipidemia     Hypertension     Osteoarthritis     Osteoporosis     Vitiligo      Social History:    Social History     Tobacco Use   Smoking Status Passive Smoke Exposure - Never Smoker   Smokeless Tobacco Never Used   Tobacco Comment    parents and  smoked     Current Medications:  Current Outpatient Medications   Medication Sig Dispense Refill    Cholecalciferol (VITAMIN D) 50 MCG (2000 UT) CAPS capsule Take by mouth      acetaminophen (TYLENOL) 325 MG tablet Take 650 mg by mouth every 6 hours as needed for Pain      dilTIAZem (CARDIZEM CD) 180 MG extended release capsule Take 1 capsule by mouth daily 90 capsule 1    dilTIAZem (CARDIZEM) 30 MG tablet Take 1 tablet by mouth 4 times daily as needed (For episodes of atrial fibrillation with heart rate >100) 60 tablet 0    montelukast (SINGULAIR) 10 MG tablet Take 1 tablet by mouth nightly 90 tablet 3    beclomethasone (QVAR) 80 MCG/ACT inhaler Inhale 1 puff into the lungs 2 times daily 3 Inhaler 3    pantoprazole (PROTONIX) 40 MG tablet Take 1 tablet by mouth 2 times daily (before meals) 180 tablet 1    Prenatal MV-Min-Fe Fum-FA-DHA (PRENATAL 1 PO) Take by mouth      b complex vitamins capsule Take 1 capsule by mouth daily      Cranberry 1000 MG CAPS Take by mouth      atorvastatin (LIPITOR) 10 MG tablet TAKE 1 TABLET BY MOUTH ONE TIME A DAY 90 tablet 3    Magnesium 500 MG CAPS Take by mouth      levothyroxine (SYNTHROID) 50 MCG tablet TAKE ONE TABLET BY MOUTH DAILY 90 tablet 2    cetirizine (ZYRTEC) 10 MG tablet Take 10 mg by mouth daily.  apixaban (ELIQUIS) 5 MG TABS tablet Take 1 tablet by mouth 2 times daily (Patient not taking: Reported on 3/9/2020) 180 tablet 1     No current facility-administered medications for this visit. REVIEW OF SYSTEMS:   CONSTITUTIONAL: No major weight gain or loss, fatigue, weakness, night sweats or fever. There's been no change in energy level, sleep pattern, or activity level. HEENT: No new vision difficulties or ringing in the ears. RESPIRATORY: No new SOB, PND, orthopnea or cough.    CARDIOVASCULAR: See HPI  GI: No nausea, vomiting, diarrhea, Results   Component Value Date    TSH 2.09 12/08/2015     Lab Results   Component Value Date    WBC 6.0 07/10/2019    HGB 8.6 (L) 07/10/2019    HCT 25.9 (L) 07/10/2019    MCV 80.6 07/10/2019     07/10/2019     No components found for: CHLPL  Lab Results   Component Value Date    TRIG 130 01/17/2017    TRIG 242 (H) 12/08/2015    TRIG 179 (H) 06/23/2015     Lab Results   Component Value Date    HDL 59 01/17/2017    HDL 52 12/08/2015    HDL 50 06/23/2015     Lab Results   Component Value Date    LDLCALC 79 01/17/2017    LDLCALC 129 12/08/2015    LDLCALC 93 06/23/2015     Lab Results   Component Value Date    LABVLDL 26 01/17/2017    LABVLDL 36 06/23/2015    LABVLDL 36 04/19/2014     Radiology Review:  Pertinent images / reports were reviewed as a part of this visit and reveals the following:    ECHO:  12/2018  Normal left ventricle size, wall thickness, and systolic function with an  estimated ejection fraction of 55%. Mild mitral annular calcification is present. Mild mitral regurgitation. Mild tricuspid regurgitation with RVSP of 34 mmHg.     Stress Test: 6/2017  There is a small anterior fixed defect consistent with breast attenuation artifact.   There is no evidence of stress induced ischemia.   Normal LV function.       Assessment:   1. Atrial Fibrillation  2. Hypertension  3. Hyperlipidemia  4. Cough- patient stated the cough has improved since stopping Lisinopril    Patient  is stable since last office visit. Plan:   Continue current medications  Follow up with GI for your colonoscopy. Call after so we can start anti-coagulation- Eliquis. Follow up in one month     I have addressed the patient's cardiac risk factors and adjusted pharmacologic treatment as needed. In addition, I have reinforced the need for patient directed risk factor modification. Further evaluation will be based upon the patient's clinical course and testing results.     All questions and concerns were addressed to the patient/family. Alternatives to  treatment were discussed. The patient  currently  is not smoking. The risks related to smoking were reviewed with the patient. Recommend maintaining a smoke-free lifestyle. Products available for smoking cessation were discussed. Daily weight, low sodium diet were discussed. Patient instructed to call the office with a weight gain: > 3 lbs over night or 5 lbs in one week; swelling, SOB/orthopnea/PND      Pt is on a statin    Saturated fat diet discussed  Exercise program discussed    Thank you for allowing to us to participate in the care of Sid Sicard. [place] CNP

## 2020-03-10 LAB — ANCA IFA: NORMAL

## 2020-03-13 LAB
ALLERGEN BEEF: <0.1 KU/L
ALLERGEN PHOMA BETAE: <0.1 KU/L
ALLERGEN PORK: <0.1 KU/L
ALLERGEN SEE NOTE: NORMAL
ALLERGEN, FEATHER MIX: NEGATIVE KU/L
ASPERGILLUS FLAVUS ANTIBODIES: NORMAL
ASPERGILLUS FUMIGATUS #1: NORMAL
ASPERGILLUS FUMIGATUS #2: NORMAL
ASPERGILLUS FUMIGATUS #3: NORMAL
ASPERGILLUS FUMIGATUS #6: NORMAL
AUREOBASIDIUM PULLULANS: NORMAL
MICROPOLYSPORA FAENI: NORMAL
PIGEON SERUM ABS: NORMAL
SACCHAROMONOSPORA VIRIDIS: NORMAL
THERMOACTINOMYCES CANDIDUS AB: NORMAL
THERMOACTINOMYCES VULGARIS #1: NORMAL

## 2020-03-20 ENCOUNTER — OFFICE VISIT (OUTPATIENT)
Dept: PULMONOLOGY | Age: 75
End: 2020-03-20
Payer: MEDICARE

## 2020-03-20 VITALS — DIASTOLIC BLOOD PRESSURE: 78 MMHG | SYSTOLIC BLOOD PRESSURE: 138 MMHG | HEART RATE: 66 BPM

## 2020-03-20 PROCEDURE — 99214 OFFICE O/P EST MOD 30 MIN: CPT | Performed by: INTERNAL MEDICINE

## 2020-03-20 ASSESSMENT — ENCOUNTER SYMPTOMS
CONSTIPATION: 0
SINUS PRESSURE: 0
SHORTNESS OF BREATH: 1
ANAL BLEEDING: 0
ABDOMINAL DISTENTION: 0
BLOOD IN STOOL: 0
RHINORRHEA: 0
DIARRHEA: 0
VOICE CHANGE: 1
BACK PAIN: 0
APNEA: 0
COUGH: 1
WHEEZING: 0
CHEST TIGHTNESS: 0
ABDOMINAL PAIN: 0
CHOKING: 0
SORE THROAT: 0
STRIDOR: 0

## 2020-03-20 NOTE — PROGRESS NOTES
(66.7 kg)     BP Readings from Last 3 Encounters:   03/20/20 138/78   03/09/20 130/80   03/04/20 (!) 166/76         Physical Exam  Constitutional:       General: She is not in acute distress. Appearance: She is well-developed. She is not diaphoretic. HENT:      Mouth/Throat:      Pharynx: No oropharyngeal exudate. Cardiovascular:      Rate and Rhythm: Normal rate and regular rhythm. Heart sounds: Normal heart sounds. No murmur. Pulmonary:      Effort: No respiratory distress. Breath sounds: Normal breath sounds. No wheezing or rales. Chest:      Chest wall: No tenderness. Abdominal:      General: There is no distension. Palpations: There is no mass. Tenderness: There is no abdominal tenderness. There is no guarding or rebound. Musculoskeletal:         General: No swelling, tenderness or deformity. Skin:     Coloration: Skin is not pale. Findings: No erythema or rash. Neurological:      Mental Status: She is alert and oriented to person, place, and time. Cranial Nerves: No cranial nerve deficit. Motor: No abnormal muscle tone. Coordination: Coordination normal.      Deep Tendon Reflexes: Reflexes normal.             Health Maintenance   Topic Date Due    Shingles Vaccine (2 of 3) 02/26/2012    Lipid screen  01/17/2018    Annual Wellness Visit (AWV)  05/29/2019    TSH testing  07/08/2020    Potassium monitoring  09/19/2020    Creatinine monitoring  09/19/2020    DTaP/Tdap/Td vaccine (3 - Td) 01/01/2022    DEXA (modify frequency per FRAX score)  Completed    Flu vaccine  Completed    Pneumococcal 65+ years Vaccine  Completed    Hepatitis A vaccine  Aged Out    Hepatitis B vaccine  Aged Out    Hib vaccine  Aged Out    Meningococcal (ACWY) vaccine  Aged Out          Assessment/Plan:     Diagnosis Orders   1. ILD (interstitial lung disease) ([facility]  ECHO Complete 2D W Doppler W Color    Ambulatory referral to Rheumatology   2.  Scleroderma ([facility]  ECHO

## 2020-03-23 ENCOUNTER — NURSE ONLY (OUTPATIENT)
Dept: CARDIOLOGY CLINIC | Age: 75
End: 2020-03-23
Payer: MEDICARE

## 2020-03-23 PROCEDURE — 93298 REM INTERROG DEV EVAL SCRMS: CPT | Performed by: INTERNAL MEDICINE

## 2020-03-23 PROCEDURE — G2066 INTER DEVC REMOTE 30D: HCPCS | Performed by: INTERNAL MEDICINE

## 2020-03-23 NOTE — LETTER
[address] [facility] [phone]  [facility] [phone]  Mikey Gonzalez [address] [place] [address] [phone]    Pacemaker/Defibrillator Clinic          03/22/20        [address] [address] [place] [address]        Dear Drea Craig    This letter is to inform you that we received the transmission from your monitor at home that checks your implanted heart device. The next date your monitor will automatically transmit will be 4-27-20. If your report needs attention we will notify you. Your device and monitor are wireless and most transmit cellularly, but please periodically check your monitor is still plugged in to the electrical outlet. If you still use the telephone land line to send please ensure the connection to the phone merry is secure. This will help to ensure successful automatic transmissions in the future. Also, the monitor needs to be close to you while sleeping at night. Please be aware that the remote device transmission sites are periodically monitored only during regular business hours during which simultaneous in-office device clinics are being run. If your transmission requires attention, we will contact you as soon as possible. Thank you.             [address]

## 2020-03-25 PROBLEM — K21.9 GASTROESOPHAGEAL REFLUX DISEASE: Status: RESOLVED | Noted: 2018-11-29 | Resolved: 2020-03-24

## 2020-03-26 ENCOUNTER — TELEPHONE (OUTPATIENT)
Dept: PULMONOLOGY | Age: 75
End: 2020-03-26

## 2020-03-26 RX ORDER — PREDNISONE 10 MG/1
TABLET ORAL
Qty: 58 TABLET | Refills: 0 | OUTPATIENT
Start: 2020-03-26 | End: 2020-04-05

## 2020-03-26 RX ORDER — PREDNISONE 10 MG/1
TABLET ORAL
Qty: 30 TABLET | Refills: 2 | OUTPATIENT
Start: 2020-03-26 | End: 2020-04-05

## 2020-03-26 NOTE — TELEPHONE ENCOUNTER
----- Message from Marilyn Clifford MD sent at 3/24/2020 11:12 AM EDT -----  Please prescribe prednisone 20mg daily x 2 weeks then 10mgs daily as maintenance. Send 3 month supply.

## 2020-04-17 NOTE — PROGRESS NOTES
[address]   Electrophysiology   Date: 4/20/2020      HPI: Dl Maguire is a 66 y.o. with a history of HTN, HLD and gastric reflux. She had an admission of 7/7/19 with dark tarry stools. Her EGD showed a non-bleeding pyloric ulcer. During this admission she had an episode of atrial fib with RVR which resolved with 1 dose of IV cardizem. She admitted to occasional palpitations in the past.  She was unable to be discharged on anticoagulation due to GI bleed so she was discharged with a 30 day monitor. This monitor showed no atrial fib but Sinus Rhythm with NSVT. 9/12/19 Loop recorder implantation    On 9/26/19 she had one episode of atrial fib with RVR lasting 1.5 hours. On 3/4/20 she was seen urgently because her ILR showed atrial fib with RVR starting on 3/3. Prior to the visit she was instructed to take an extra Cardizem and upon arrival she was in Sinus Rhythm. She was not started on anticoagulation due to concerns of GI bleeding and need for follow up colonoscopy, which never occurred. Today's loop recorder shows NSR with 1 tachy episode on 4/19/20 lassting 33 seconds, I bird episode on 4/5/20 lasting 14 seconds with minimum rate 30 bpm, 1 pause on 4/4/20 lasting 3 seconds, 2 AF episodes last one 4/19/20 lasting 10 minutes. Interval history:  She has not resumed her anticoagulation. She is waiting to get a colonoscopy. She follows up with pulmonology and was referred to a rheumatologist. She does notice her HR going high and low. Her blood pressure at home is usually 487-860 systolic. He has had intermittent dizziness and lightheadedness. No syncope.       Past Medical History:   Diagnosis Date    Celiac sprue     GERD (gastroesophageal reflux disease)     Hyperlipidemia     Hypertension     Osteoarthritis     Osteoporosis     Vitiligo         Past Surgical History:   Procedure Laterality Date    BRONCHOSCOPY N/A 12/7/2018    BRONCHOSCOPY ALVEOLAR LAVAGE performed by sinus pause on March 4, 2020 lasting 3 seconds. She has had intermittent lightheadedness and dizziness before. Evidence is suggestive of sick sinus syndrome. Treatment options including plantation of pacemaker discussed with the patient. The risks, benefits and alternatives of the procedure were discussed with the patient. The risks including, but not limited to, the risks of bleeding, infection, pain, device malfunction, lead dislodgement, radiation exposure, injury to cardiac and surrounding structures (including pneumothorax), stroke, cardiac perforation, tamponade, need for emergent heart surgery, myocardial infarction and death were discussed in detail. Dual vs single chamber device, including risks and benefits were discussed with patient. Printed information about ablation including risks were given. Patient was encouraged to review information given, and call back with any questions about risks, benefits and alternative of procedure. The patient opted to proceed with the device implantation. Plan for dual-chamber pacemaker implantation    -Paroxysmal atrial fibrillation   Patient also has had episodes of atrial fibrillation with rapid ventricular rate. Loop recorder also shows episodes of atrial fibrillation with rapid ventricular rate     She has not started her anticoagulation yet and she was scheduled to have colonoscopy. Will implanted pacemaker for sick sinus syndrome. Following that she can have her colonoscopy. If she cannot tolerate anticoagulation after that she is a candidate for watchman device. HTN: Not Controlled. She states that her blood pressure at home is controlled. Recommend outpatient blood pressure monitoring.      Hypothyroidism:               TSH              On synthroid. Plan:    Postpone colonoscopy  Schedule PPM in 1 month with ILR removal    Thank you for allowing me to participate in the care of Alena Mejía.   Further

## 2020-04-20 ENCOUNTER — NURSE ONLY (OUTPATIENT)
Dept: CARDIOLOGY CLINIC | Age: 75
End: 2020-04-20
Payer: MEDICARE

## 2020-04-20 ENCOUNTER — TELEPHONE (OUTPATIENT)
Dept: CARDIOLOGY CLINIC | Age: 75
End: 2020-04-20

## 2020-04-20 ENCOUNTER — OFFICE VISIT (OUTPATIENT)
Dept: CARDIOLOGY CLINIC | Age: 75
End: 2020-04-20
Payer: MEDICARE

## 2020-04-20 VITALS
WEIGHT: 149 LBS | SYSTOLIC BLOOD PRESSURE: 176 MMHG | BODY MASS INDEX: 28.13 KG/M2 | HEIGHT: 61 IN | OXYGEN SATURATION: 97 % | DIASTOLIC BLOOD PRESSURE: 85 MMHG | HEART RATE: 67 BPM

## 2020-04-20 PROCEDURE — 93291 INTERROG DEV EVAL SCRMS IP: CPT | Performed by: INTERNAL MEDICINE

## 2020-04-20 PROCEDURE — 99214 OFFICE O/P EST MOD 30 MIN: CPT | Performed by: INTERNAL MEDICINE

## 2020-04-20 PROCEDURE — 93000 ELECTROCARDIOGRAM COMPLETE: CPT | Performed by: INTERNAL MEDICINE

## 2020-04-20 RX ORDER — PANTOPRAZOLE SODIUM 40 MG/1
40 TABLET, DELAYED RELEASE ORAL
Qty: 60 TABLET | Refills: 1 | Status: SHIPPED | OUTPATIENT
Start: 2020-04-20

## 2020-04-20 NOTE — TELEPHONE ENCOUNTER
Medication Refill    Medication needing refilled:pantoprazole     Doseage of the medication:    How are you taking this medication (QD, BID, TID, QID, PRN):    30 or 90 day supply called in:    Which Pharmacy are we sending the medication to?:[facility]

## 2020-04-20 NOTE — TELEPHONE ENCOUNTER
Spoke to patient and she has 2 pills left and she wanted to know if we could send in a short supply until she can get into her PCP?   Please advise

## 2020-04-20 NOTE — LETTER
[facility]  EP Procedure Sheet    4/20/20  Alice Yoon  1945  EP Procedures    xxx Pacemaker implant (dual)  EP Study    ICD implant (single/dual)  Atrial flutter ablation (KEDAR Y/N)    Biv implant ICD  Tilt Table    Biv implant PPM  Atrial fibrillation ablation (KEDAR Yes)    Generator Change (PPM/ICD/BiV)  SVT ablation    Lead revision (RV/LA/RA) (<1 month)  VT ablation      Lead extraction +/- upgrade (BiV/PPM/ICD)  VT Ischemic/ non-ischemic   xxx Loop removal  VT RVOT    Cardioversion  VT Left sided    KEDAR  AVN ablation     Equipment    xxx Medtronic   JOLENE Mapping System    [employer]  Carto Mapping System    [employer]  CryoAblation    Biotronik  Laser Lead Extraction     EP Procedures Scheduling Request    # hours Requested   Scheduled  Date:   Specific Day  Completed    Anesthesia yes F/u Date:   CT surgery backup      Overnight stay      Location [facility]       Pre-Procedure Labs / Imaging     PT/INR  Type & cross    CBC  Units PRBC    BMP/Mg  Units FFP    Venogram  CXR    Echo  Cardiac CTA for Pulmonary vein mapping     RN INITIALS:  LS   Patient Instructions  Do not eat or drink after midnight the night prior to procedure  Dx:tachy bird

## 2020-04-20 NOTE — PROGRESS NOTES
Patient comes in for interrogation of their implanted loop recorder. Interrogation shows 1 Tachy episode on 4/19/2020 lasting 33 seconds with a Max V rate of 182 bpm. 1 pause episode on 3/4/2020 lasting 3 seconds. 1 Justin episode on 4/5/2020 lasting 14 seconds with a Min V rate of 30 bpm.  2 AF episodes. Last on 4/19/2020, longest 26 minutes. Implanted for AF. Patient currently not on [facility] medication due to GI bleed. Patient will see Dr. Vianey Lazaro today and we will follow the patient remotely.

## 2020-05-04 RX ORDER — ATORVASTATIN CALCIUM 10 MG/1
TABLET, FILM COATED ORAL
Qty: 90 TABLET | Refills: 3 | Status: SHIPPED | OUTPATIENT
Start: 2020-05-04

## 2020-05-11 ENCOUNTER — OFFICE VISIT (OUTPATIENT)
Dept: PRIMARY CARE CLINIC | Age: 75
End: 2020-05-11

## 2020-05-14 PROBLEM — I49.5 SICK SINUS SYNDROME (HCC): Status: ACTIVE | Noted: 2020-05-14

## 2020-05-14 LAB
SARS-COV-2: NOT DETECTED
SOURCE: NORMAL

## 2020-05-14 PROCEDURE — C1785 PMKR, DUAL, RATE-RESP: HCPCS

## 2020-05-14 PROCEDURE — C1894 INTRO/SHEATH, NON-LASER: HCPCS

## 2020-05-14 PROCEDURE — C1898 LEAD, PMKR, OTHER THAN TRANS: HCPCS

## 2020-05-15 ENCOUNTER — APPOINTMENT (OUTPATIENT)
Dept: GENERAL RADIOLOGY | Age: 75
End: 2020-05-15
Attending: INTERNAL MEDICINE
Payer: MEDICARE

## 2020-05-15 PROBLEM — J84.9 ILD (INTERSTITIAL LUNG DISEASE) (HCC): Status: ACTIVE | Noted: 2020-05-15

## 2020-05-15 PROBLEM — Z45.09 ENCOUNTER FOR ELECTRONIC ANALYSIS OF REVEAL EVENT RECORDER: Status: RESOLVED | Noted: 2019-09-18 | Resolved: 2020-05-15

## 2020-05-15 PROCEDURE — 71046 X-RAY EXAM CHEST 2 VIEWS: CPT

## 2020-05-18 PROBLEM — Z95.0 PACEMAKER: Status: ACTIVE | Noted: 2020-05-18

## 2020-05-21 ENCOUNTER — NURSE ONLY (OUTPATIENT)
Dept: CARDIOLOGY CLINIC | Age: 75
End: 2020-05-21
Payer: MEDICARE

## 2020-05-21 PROCEDURE — 93280 PM DEVICE PROGR EVAL DUAL: CPT | Performed by: INTERNAL MEDICINE

## 2020-05-21 RX ORDER — DILTIAZEM HYDROCHLORIDE 240 MG/1
240 CAPSULE, COATED, EXTENDED RELEASE ORAL DAILY
Qty: 30 CAPSULE | Refills: 5 | Status: SHIPPED | OUTPATIENT
Start: 2020-05-21 | End: 2020-09-01 | Stop reason: SDUPTHER

## 2020-05-21 NOTE — PROGRESS NOTES
Patient has elevated V rate on her device interrogation and HTN. PER MXA increased her diltiazem to 240 mg. Her BP was 162/80 and she states its always high.  I told her to contact the office if her BP stays greater than 130/80

## 2020-06-01 ENCOUNTER — TELEPHONE (OUTPATIENT)
Dept: CARDIOLOGY CLINIC | Age: 75
End: 2020-06-01

## 2020-06-01 RX ORDER — LOSARTAN POTASSIUM 25 MG/1
25 TABLET ORAL DAILY
Qty: 30 TABLET | Refills: 3 | Status: SHIPPED | OUTPATIENT
Start: 2020-06-01

## 2020-06-02 RX ORDER — DILTIAZEM HYDROCHLORIDE 120 MG/1
120 CAPSULE, COATED, EXTENDED RELEASE ORAL NIGHTLY
Qty: 90 CAPSULE | Refills: 1 | Status: SHIPPED | OUTPATIENT
Start: 2020-06-02 | End: 2020-09-01 | Stop reason: SDUPTHER

## 2020-06-02 NOTE — TELEPHONE ENCOUNTER
That is fine. She can remain off the losartan. Please see if she has any other dosages of Cardizem CD at home besides her normal 240 mg dosage that she takes daily currently. I believe she was previously on a lower dosage so we may be able to add a nightly dose to see if this will improve her BP readings.

## 2020-08-14 ENCOUNTER — TELEPHONE (OUTPATIENT)
Dept: CARDIOLOGY CLINIC | Age: 75
End: 2020-08-14

## 2020-08-14 NOTE — TELEPHONE ENCOUNTER
Moving to [place] and wants to see RMM before she leaves 9/10/20. Can she get appt with RMM for quick check up ? LM if she doesn't answer.

## 2020-08-31 ENCOUNTER — OFFICE VISIT (OUTPATIENT)
Dept: PULMONOLOGY | Age: 75
End: 2020-08-31
Payer: MEDICARE

## 2020-08-31 VITALS
SYSTOLIC BLOOD PRESSURE: 162 MMHG | DIASTOLIC BLOOD PRESSURE: 95 MMHG | OXYGEN SATURATION: 97 % | RESPIRATION RATE: 18 BRPM | HEART RATE: 63 BPM | BODY MASS INDEX: 28.32 KG/M2 | WEIGHT: 150 LBS | HEIGHT: 61 IN

## 2020-08-31 PROCEDURE — 99214 OFFICE O/P EST MOD 30 MIN: CPT | Performed by: INTERNAL MEDICINE

## 2020-08-31 RX ORDER — MONTELUKAST SODIUM 10 MG/1
10 TABLET ORAL NIGHTLY
Qty: 90 TABLET | Refills: 0 | Status: SHIPPED | OUTPATIENT
Start: 2020-08-31

## 2020-08-31 ASSESSMENT — ENCOUNTER SYMPTOMS
SINUS PRESSURE: 0
STRIDOR: 0
ABDOMINAL DISTENTION: 0
SHORTNESS OF BREATH: 1
APNEA: 0
CONSTIPATION: 0
RHINORRHEA: 0
VOICE CHANGE: 0
SORE THROAT: 0
CHOKING: 0
COUGH: 1
BLOOD IN STOOL: 0
ANAL BLEEDING: 0
ABDOMINAL PAIN: 0
CHEST TIGHTNESS: 1
WHEEZING: 0
BACK PAIN: 0
DIARRHEA: 0

## 2020-08-31 NOTE — PROGRESS NOTES
[address]   Electrophysiology   Date: 9/1/2020      HPI: Roger Arnett is a 66 y.o. with a history of HTN, HLD and gastric reflux. She had an admission of 7/7/19 with dark tarry stools. Her EGD showed a non-bleeding pyloric ulcer. During this admission she had an episode of atrial fib with RVR which resolved with 1 dose of IV cardizem. She admitted to occasional palpitations in the past.  She was unable to be discharged on anticoagulation due to GI bleed so she was discharged with a 30 day monitor. This monitor showed no atrial fib but Sinus Rhythm with NSVT. 9/12/19 Loop recorder implantation    On 9/26/19 she had one episode of atrial fib with RVR lasting 1.5 hours. On 3/4/20 she was seen urgently because her ILR showed atrial fib with RVR starting on 3/3. Prior to the visit she was instructed to take an extra Cardizem and upon arrival she was in Sinus Rhythm. She was not started on anticoagulation due to concerns of GI bleeding and need for follow up colonoscopy, which never occurred. S/p dual chamber PPM 5/2020    Interval history:   Janay Garces presents to the office in follow up. She is moving to [place] to live near family. She has not noticed any bleeding. They are moving 9/12/2020.      Past Medical History:   Diagnosis Date    Celiac sprue     GERD (gastroesophageal reflux disease)     Hyperlipidemia     Hypertension     Osteoarthritis     Osteoporosis     Vitiligo         Past Surgical History:   Procedure Laterality Date    BRONCHOSCOPY N/A 12/7/2018    BRONCHOSCOPY ALVEOLAR LAVAGE performed by Angi Ramírez MD at [address]  05/14/2020    Dual chamber Medtronic PPM (Dr. Zhang Bartholomew)    COLONOSCOPY  2008    NORMAL    COLONOSCOPY  4/10/2014    COLONOSCOPY N/A 9/3/2019    COLONOSCOPY POLYPECTOMY SNARE/COLD BIOPSY performed by Delfino Carey MD at [address] [facility]  9/3/2019    COLONOSCOPY SUBMUCOSAL/BOTOX INJECTION performed by Michelle Bull MD at [address] ENDOSCOPY N/A 7/8/2019    EGD BIOPSY performed by Michelle Bull MD at [place] [address] N/A 9/3/2019    EGD DILATION BALLOON performed by Michelle Bull MD at [address] [facility] ENDOSCOPY     Allergies   Allergen Reactions    Voltaren [Diclofenac Sodium] Other (See Comments)     Renal failure    Amlodipine Other (See Comments)     Leg swelling    Lisinopril Other (See Comments)     Cough    Pravastatin Other (See Comments)     Muscle aches    Nitrofurantoin Nausea And Vomiting and Other (See Comments)     Body aches, chills    Sulfa Antibiotics Rash       Social History:   reports that she is a non-smoker but has been exposed to tobacco smoke. She has never used smokeless tobacco. She reports current alcohol use of about 3.0 standard drinks of alcohol per week. She reports that she does not use drugs. Family History:  family history includes Arthritis in her father and mother; Cancer in her father; Diabetes in her mother; Heart Disease in her mother; Kidney Disease in her mother. Review of System:  All other systems reviewed except for that noted above. Pertinent negatives and positives are:       General: negative for fever, chills    Ophthalmic ROS: negative for - eye pain or loss of vision   ENT ROS: negative for - headaches, sore throat    Respiratory: negative for - cough, sputum   Cardiovascular: Reviewed in HPI   Gastrointestinal: negative for - abdominal pain, diarrhea, N/V   Hematology: negative for - bleeding, blood clots, bruising or jaundice   Genito-Urinary:  negative for - Dysuria or incontinence   Musculoskeletal: negative for - Joint swelling, muscle pain   Neurological: negative for - confusion, +++dizziness, headaches    Psychiatric: No anxiety, no depression.    Dermatological: negative for - rash    Physical Examination:  Vitals:    09/01/20 1000   BP: (!) 177/88 (SINGULAIR) 10 MG tablet Take 1 tablet by mouth nightly 8/31/20  Yes Rodney Orta MD   dilTIAZem (CARDIZEM CD) 120 MG extended release capsule Take 1 capsule by mouth nightly 6/2/20  Yes MEHRDAD Mittal - CNP   losartan (COZAAR) 25 MG tablet Take 1 tablet by mouth daily 6/1/20  Yes MEHRDAD Mittal - CNP   dilTIAZem (CARDIZEM CD) 240 MG extended release capsule Take 1 capsule by mouth daily 5/21/20  Yes David Simpson MD   atorvastatin (LIPITOR) 10 MG tablet TAKE 1 TABLET BY MOUTH ONE TIME A DAY 5/4/20  Yes Audrey Salgado MD   pantoprazole (PROTONIX) 40 MG tablet Take 1 tablet by mouth 2 times daily (before meals) 4/20/20  Yes MEHRDAD Mittal - CNP   Multiple Vitamins-Minerals (ICAPS AREDS FORMULA PO) Take by mouth   Yes Historical Provider, MD   Cholecalciferol (VITAMIN D) 50 MCG (2000 UT) CAPS capsule Take by mouth   Yes Historical Provider, MD   acetaminophen (TYLENOL) 325 MG tablet Take 650 mg by mouth every 6 hours as needed for Pain   Yes Historical Provider, MD   Prenatal MV-Min-Fe Fum-FA-DHA (PRENATAL 1 PO) Take by mouth   Yes Historical Provider, MD   b complex vitamins capsule Take 1 capsule by mouth daily   Yes Historical Provider, MD   Cranberry 1000 MG CAPS Take by mouth   Yes Historical Provider, MD   Magnesium 500 MG CAPS Take by mouth   Yes Historical Provider, MD   levothyroxine (SYNTHROID) 50 MCG tablet TAKE ONE TABLET BY MOUTH DAILY 1/31/17  Yes Maxi Smoker, MD   cetirizine (ZYRTEC) 10 MG tablet Take 10 mg by mouth daily. Yes Historical Provider, MD       Assessment and plan:     - Sick sinus syndrome:    S/p dual chamber PPM 5/2020   AP 62.6%  0.1% 12.1 years remaining battery   Has had episodes of atrial fibrillation last on August 30. She is moving to [place].   She will find an electrophysiologist in [place] and will follow with them    -Paroxysmal atrial fibrillation   0.4% Afib burden on today's interrogation   ECG today shows Atrial paced   Discussed starting Eliquis 5 mg BID, We will give her a prescriptions and she will not begin it until she moves and establishes medical care in [place]   Patient also has had episodes of atrial fibrillation with rapid ventricular rate. Cardizem  mg in am and 120 mg PM                  -HTN  Vitals:    09/01/20 1000   BP: (!) 177/88   Pulse:    Resp:       -Home BP monitoring encourage with a BP goal <130/80       -Hypothyroidism:               TSH              On synthroid. Plan:    Eliquis 5 mg BID    Thank you for allowing me to participate in the care of Stanley Storm. Further evaluation will be based upon the patient's clinical course and testing results. All questions and concerns were addressed to the patient/family. Alternatives to my treatment were discussed. I have discussed the above stated plan and the patient verbalized understanding and agreed with the plan. NOTE: This report was transcribed using voice recognition software. Every effort was made to ensure accuracy, however, inadvertent computerized transcription errors may be present. Nanci Gordon MD, MPH  [place] [address]   Office: [phone]    Scribe attestation: This note was scribed in the presence of Nanci Gordon MD by Yayo Escamilla RN  Physician Attestation: I, Dr. Nanci Gordon, confirm that the scribe's documentation has been prepared under my direction and personally reviewed by me in its entirety. I also confirm that the note above accurately reflects all work, treatment, procedures, and medical decision making performed by me.

## 2020-08-31 NOTE — PROGRESS NOTES
PLACEMENT  05/14/2020    Dual chamber Medtronic PPM (Dr. Sulma Leblanc)    COLONOSCOPY  2008    NORMAL    COLONOSCOPY  4/10/2014    COLONOSCOPY N/A 9/3/2019    COLONOSCOPY POLYPECTOMY SNARE/COLD BIOPSY performed by Nena Contreras MD at [address] [facility] COLONOSCOPY  9/3/2019    COLONOSCOPY SUBMUCOSAL/BOTOX INJECTION performed by Nena Contreras MD at [address] ENDOSCOPY N/A 7/8/2019    EGD BIOPSY performed by Nena Contreras MD at [address] [facility] ENDOSCOPY N/A 9/3/2019    EGD DILATION BALLOON performed by Nena Contreras MD at [address] [facility]     Family History   Problem Relation Age of Onset    Kidney Disease Mother     Diabetes Mother     Heart Disease Mother     Arthritis Mother     Cancer Father     Arthritis Father        Review of Systems:  Review of Systems   Constitutional: Negative for activity change, appetite change, fatigue and fever. HENT: Negative for congestion, ear discharge, ear pain, postnasal drip, rhinorrhea, sinus pressure, sneezing, sore throat, tinnitus and voice change. Respiratory: Positive for cough, chest tightness and shortness of breath. Negative for apnea, choking, wheezing and stridor. Cardiovascular: Negative for chest pain, palpitations and leg swelling. Gastrointestinal: Negative for abdominal distention, abdominal pain, anal bleeding, blood in stool, constipation and diarrhea. Musculoskeletal: Negative for arthralgias, back pain and gait problem. Skin: Negative for pallor and rash. Allergic/Immunologic: Negative for environmental allergies. Neurological: Negative for dizziness, tremors, seizures, syncope, speech difficulty, weakness, light-headedness, numbness and headaches. Hematological: Negative for adenopathy. Does not bruise/bleed easily. Psychiatric/Behavioral: Negative for sleep disturbance.        Vitals:    08/31/20 1022   BP: (!) 162/95   Pulse: 63   Resp: 18   SpO2: 97%   Weight: Meningococcal (ACWY) vaccine  Aged Out          Assessment/Plan:    Patient has positive MAURICE 1:160 + anti-SCL suggestive of scleroderma. Patient also gives a remote history of lupus. Noted to have interstitial lung disease with NSIP pattern which is likely to be related to scleroderma. No typical skin symptoms or dysphagia noted. Patient had been previously referred to rheumatology which she was not able to see due to the COVID-19 pandemic. Patient also has history of celiac disease and vitiligo. Also treated for cough variant asthma and seasonal allergies. Continue with Qvar, Singulair. Patient states that she does not require albuterol inhaler. Requested for a 2D echocardiogram which was not performed. Patient has plans to move permanently to [place] in the next couple of weeks. Long discussion with patient and , suggested that she should establish care with pulmonary and rheumatology for further management. Patient might benefit from AZT +/-mycophenolate, which will need to be discussed with rheumatology. Follow-up as needed.

## 2020-09-01 ENCOUNTER — OFFICE VISIT (OUTPATIENT)
Dept: CARDIOLOGY CLINIC | Age: 75
End: 2020-09-01
Payer: COMMERCIAL

## 2020-09-01 ENCOUNTER — NURSE ONLY (OUTPATIENT)
Dept: CARDIOLOGY CLINIC | Age: 75
End: 2020-09-01
Payer: COMMERCIAL

## 2020-09-01 VITALS
BODY MASS INDEX: 27.97 KG/M2 | SYSTOLIC BLOOD PRESSURE: 177 MMHG | WEIGHT: 148.12 LBS | HEIGHT: 61 IN | RESPIRATION RATE: 18 BRPM | HEART RATE: 91 BPM | DIASTOLIC BLOOD PRESSURE: 88 MMHG

## 2020-09-01 PROCEDURE — 93280 PM DEVICE PROGR EVAL DUAL: CPT | Performed by: INTERNAL MEDICINE

## 2020-09-01 PROCEDURE — 99214 OFFICE O/P EST MOD 30 MIN: CPT | Performed by: INTERNAL MEDICINE

## 2020-09-01 RX ORDER — DILTIAZEM HYDROCHLORIDE 120 MG/1
120 CAPSULE, COATED, EXTENDED RELEASE ORAL NIGHTLY
Qty: 90 CAPSULE | Refills: 1 | Status: SHIPPED | OUTPATIENT
Start: 2020-09-01 | End: 2020-11-20 | Stop reason: SDUPTHER

## 2020-09-01 RX ORDER — DILTIAZEM HYDROCHLORIDE 240 MG/1
240 CAPSULE, COATED, EXTENDED RELEASE ORAL DAILY
Qty: 90 CAPSULE | Refills: 1 | Status: SHIPPED | OUTPATIENT
Start: 2020-09-01

## 2020-09-02 NOTE — PROGRESS NOTES
Patient comes in for programming evaluation for her pacemaker. All sensing and pacing parameters are within normal range. Battery life 12.1 years. AP  62.6%.  0.1%. AF noted with a 0.4% burden. Some episodes with RVR. Last episodes on 8/30/2020. Longest 9 minutes. Patient will see Dr. Anum Durand today to discuss [facility] medication. Patient remains on Cardizem. No changes need to be made at this time. Please see interrogation for more detail.

## 2020-11-20 RX ORDER — DILTIAZEM HYDROCHLORIDE 120 MG/1
120 CAPSULE, COATED, EXTENDED RELEASE ORAL NIGHTLY
Qty: 90 CAPSULE | Refills: 3 | Status: SHIPPED | OUTPATIENT
Start: 2020-11-20

## 2020-11-20 NOTE — TELEPHONE ENCOUNTER
RX APPROVAL:      Refill:   Requested Prescriptions      No prescriptions requested or ordered in this encounter      Last OV: 9/1/2020   Last EKG:   Last Labs:   Lab Results   Component Value Date    GLUCOSE 97 05/14/2020    GLUCOSE 97 03/09/2017    BUN 19 05/14/2020    CREATININE 0.7 05/14/2020    LABGLOM >60 05/14/2020    EGFRAA >60.0 03/09/2017    BCR 27 12/01/2016     05/14/2020    K 4.0 05/14/2020     05/14/2020    CO2 22 05/14/2020    CALCIUM 9.6 05/14/2020     Lab Results   Component Value Date     05/14/2020     05/14/2020    CO2 22 05/14/2020    ANIONGAP 13 05/14/2020    GLUCOSE 97 05/14/2020    GLUCOSE 97 03/09/2017    BUN 19 05/14/2020    CREATININE 0.7 05/14/2020    LABGLOM >60 05/14/2020    GFRAA >60 05/14/2020    GFRAA >60 01/12/2012    CALCIUM 9.6 05/14/2020    PROT 6.5 07/08/2019    PROT 8.0 03/09/2017    LABALBU 3.2 07/10/2019    BILITOT 0.3 07/08/2019    ALKPHOS 67 07/08/2019    AST 26 07/08/2019    ALT 13 07/08/2019    GLOB 2.9 07/08/2019     Lab Results   Component Value Date    ALT 13 07/08/2019    AST 26 07/08/2019     Lab Results   Component Value Date    K 4.0 05/14/2020       Plan and labs reviewed

## 2020-11-20 NOTE — TELEPHONE ENCOUNTER
Medication Refill    Medication needing refilled: dilTIAZem (CARDIZEM CD) 120 MG extended release capsule    Dosage of the medication:    How are you taking this medication (QD, BID, TID, QID, PRN): Take 1 capsule by mouth nightly   30 or 90 day supply called in: 80    Which Pharmacy are we sending the medication to?:  [address] [place] [address] [phone]

## 2020-12-01 ENCOUNTER — NURSE ONLY (OUTPATIENT)
Dept: CARDIOLOGY CLINIC | Age: 75
End: 2020-12-01
Payer: COMMERCIAL

## 2020-12-01 PROCEDURE — 93296 REM INTERROG EVL PM/IDS: CPT | Performed by: INTERNAL MEDICINE

## 2020-12-01 PROCEDURE — 93294 REM INTERROG EVL PM/LDLS PM: CPT | Performed by: INTERNAL MEDICINE

## 2020-12-01 NOTE — PROGRESS NOTES
We received remote transmission from patient's monitor at home. Transmission shows normal sensing and pacing function. Pt remains on Eliquis for known AF. Noted RVR. EP physician will review. See interrogation under cardiology tab in the [address] [place] [address] [address] field for more details.

## 2020-12-01 NOTE — LETTER
[address] [facility] [phone]  [address] [facility] [address] [facility] [phone]    Pacemaker/Defibrillator Clinic          12/01/20        Travis Dooley  [address] [facility] [address]        Dear Travis Dooley    This letter is to inform you that we received the transmission from your monitor at home that checks your implanted heart device. The next date your monitor will automatically transmit will be 3-2-21. If your report needs attention we will notify you. Your device and monitor are wireless and most transmit cellularly, but please periodically check your monitor is still plugged in to the electrical outlet. If you still use the telephone land line to send please ensure the connection to the phone merry is secure. This will help to ensure successful automatic transmissions in the future. Also, the monitor needs to be close to you while sleeping at night. Please be aware that the remote device transmission sites are periodically monitored only during regular business hours during which simultaneous in-office device clinics are being run. If your transmission requires attention, we will contact you as soon as possible. Thank you.             [facility]

## 2020-12-04 ENCOUNTER — TELEPHONE (OUTPATIENT)
Dept: CARDIOLOGY CLINIC | Age: 75
End: 2020-12-04

## 2020-12-04 NOTE — TELEPHONE ENCOUNTER
----- Message from Elliott Becker MD sent at 12/3/2020  7:44 PM EST -----  Please contact patient and see if she has moved to [place]. She needs a cardiologist there since she has Afib with RVR. If she still in [place], needs NP office visit for optimization of her medications to prevent Afib with RVR.

## 2020-12-04 NOTE — TELEPHONE ENCOUNTER
Patient now lives in [place] and having a hard time getting in to PCP or new EP because all of the \"snow birds\" going to [place] and taking all appts with doctors. She will call as soon as she gets cardiologist to give us the name and phone number to get device monitoring transferred to them . She may need to get a few refills until she gets the new doctor .

## 2020-12-04 NOTE — TELEPHONE ENCOUNTER
Please call patient and offer follow up with NP. If she is moved to [place], please inquire about her new cardiologist to see about transferring device monitoring.   She needs to follow with a cardiologist.     Jose Damon, APRN-CNP

## 2020-12-04 NOTE — TELEPHONE ENCOUNTER
Pt has moved to [place] and needs medical records sent to kevan Mane.  Please fax to [phone], Phone number is [phone]

## 2020-12-04 NOTE — TELEPHONE ENCOUNTER
Please send most recent device interrogation to the provided fax number. She needs to get her device monitoring transferred. Will forward to Ashley to see about this also.

## 2020-12-04 NOTE — TELEPHONE ENCOUNTER
Called and spoke to pt. She is working to obtain a PCP and cardiologist, but is currently finding it difficult due to the [place] pandemic and holiday season. Discussed findings on pacemaker and possible need to add BB for rhythm control. Pt CRYSTAL. Will continue current regimen for now. She will call office when she needs refills until this can be taken care of by her new cardiologist in [place].     Yeny Thomas, APRN-CNP

## 2020-12-07 ENCOUNTER — TELEPHONE (OUTPATIENT)
Dept: CARDIOLOGY CLINIC | Age: 75
End: 2020-12-07

## 2020-12-07 NOTE — TELEPHONE ENCOUNTER
Called last week asking that records be faxed to her new cardiologist in [place] . The doctor told the patient he didn't get all records . Please fax all office visits, labs , EKG's, echo, GXT, and info on her loop recorder.  Please send to Dr Erin Beaulieu fax  [phone]

## 2020-12-07 NOTE — TELEPHONE ENCOUNTER
Faxed most recent transmission to fax number provided. Asked them to request her in [facility] so that we could get her remote monitoring transferred.  Thanks

## 2020-12-16 ENCOUNTER — TELEPHONE (OUTPATIENT)
Dept: CARDIOLOGY CLINIC | Age: 75
End: 2020-12-16

## 2020-12-16 NOTE — TELEPHONE ENCOUNTER
Please see telephone encounter from 12/7/2020  It appears this was already requested but could you double check?

## 2020-12-16 NOTE — TELEPHONE ENCOUNTER
Patient moved to [place] and needs to arrange for her device to be monitored there . She was told to call this office and ask that her device be \"released\" so that they can get her set up in [place]. Please call patient to let her know this has been done and what she needs to do next .

## 2024-01-13 NOTE — PROGRESS NOTES
ulcers  H/o GIB -- has had endo and colonoscopies  Due for repeat colonoscopy. Start [address] if ok with GI. Will choose eliquis  F/w Dr. Dajuan Diggs      No diagnosis found. No problem-specific Assessment & Plan notes found for this encounter. No orders of the defined types were placed in this encounter. Plan:  1. We discussed the risk vs benefit of blood thinners in light of potential embolus formation. She will be having a repeat colonoscopy soon. She will discuss with Dr. Dajuan Diggs about starting NOAC. 2. Encouraged regular exercise  3. RTO 2 months with Dr. Salma Fabian  4. OV with Delgado Talbot Nov 2019    Carlos Montoya MD    Thank you for allowing to me to participate in the care of Lukas Driscoll. This note was scribed in the presence of Dr. Salma Fabian by Juan Pena RN.    I, Dr. Arnel Rousseau, personally performed the services described in this documentation, as scribed by the above signed scribe in my presence. It is both accurate and complete to my knowledge. I agree with the details independently gathered by the clinical support staff, while the remaining scribed note accurately describes my personal service to the patient.
Initial (On Arrival)

## 2025-03-27 ENCOUNTER — OFFICE VISIT (OUTPATIENT)
Dept: URBAN - METROPOLITAN AREA CLINIC 68 | Facility: CLINIC | Age: 80
End: 2025-03-27

## (undated) DEVICE — Device: Brand: DISPOSABLE ELECTROSURGICAL SNARE

## (undated) DEVICE — BW-412T DISP COMBO CLEANING BRUSH: Brand: SINGLE USE COMBINATION CLEANING BRUSH

## (undated) DEVICE — CLIP INT L235CM WRK CHN DIA2.8MM OPN 11MM BRAID CATH ROT BX/10

## (undated) DEVICE — SOLUTION IV IRRIG WATER 500ML POUR BRL ST 2F7113

## (undated) DEVICE — SINGLE USE BIOPSY VALVE MAJ-210: Brand: SINGLE USE BIOPSY VALVE (STERILE)

## (undated) DEVICE — FORCEPS BX L240CM WRK CHN 2.8MM STD CAP W/ NDL MIC MESH

## (undated) DEVICE — SET VLV 3 PC AWS DISPOSABLE GRDIAN SCOPEVALET

## (undated) DEVICE — GOWN AURORA NONREINF LG: Brand: MEDLINE INDUSTRIES, INC.

## (undated) DEVICE — 60 ML SYRINGE,REGULAR TIP: Brand: MONOJECT

## (undated) DEVICE — Device: Brand: SPOT EX ENDOSCOPIC TATTOO

## (undated) DEVICE — NEEDLE 25GAX5.0MM INJ CARR LOCKE

## (undated) DEVICE — Device

## (undated) DEVICE — MOUTHPIECE ENDOSCP L CTRL OPN AND SIDE PORTS DISP

## (undated) DEVICE — TRAP SPEC RETRV CLR PLAS POLYP IN LN SUCT QUIK CTCH

## (undated) DEVICE — SYRINGE INFL 60ML DISP ALLIANCE II

## (undated) DEVICE — PROCEDURE KIT ENDOSCP CUST

## (undated) DEVICE — SINGLE USE SUCTION VALVE MAJ-209: Brand: SINGLE USE SUCTION VALVE (STERILE)

## (undated) DEVICE — AIRWAY PHGEAL SZ 9 9CM PNK AD ORAL FBROPT INTUB PLAS DISP

## (undated) DEVICE — CONMED CHANNEL MASTER PULMONARY AND PEDIATRIC CLEANING BRUSH, 160 CM X 2.0 MM: Brand: CONMED

## (undated) DEVICE — SPECIMEN TRAP: Brand: ARGYLE

## (undated) DEVICE — ESOPHAGEAL/PYLORIC/COLONIC/BILIARY WIREGUIDED BALLOON DILATATION CATHETER: Brand: CRE™ PRO